# Patient Record
Sex: FEMALE | Race: WHITE | Employment: FULL TIME | URBAN - METROPOLITAN AREA
[De-identification: names, ages, dates, MRNs, and addresses within clinical notes are randomized per-mention and may not be internally consistent; named-entity substitution may affect disease eponyms.]

---

## 2017-07-20 ENCOUNTER — HOSPITAL ENCOUNTER (OUTPATIENT)
Dept: MAMMOGRAPHY | Age: 62
Discharge: HOME | End: 2017-07-20
Attending: INTERNAL MEDICINE | Admitting: INTERNAL MEDICINE

## 2017-07-20 DIAGNOSIS — Z12.31 VISIT FOR SCREENING MAMMOGRAM: ICD-10-CM

## 2018-07-24 ENCOUNTER — HOSPITAL ENCOUNTER (OUTPATIENT)
Dept: MAMMOGRAPHY | Age: 63
Discharge: HOME | End: 2018-07-25
Admitting: INTERNAL MEDICINE

## 2018-07-24 DIAGNOSIS — Z12.31 VISIT FOR SCREENING MAMMOGRAM: ICD-10-CM

## 2018-08-08 ENCOUNTER — HOSPITAL ENCOUNTER (OUTPATIENT)
Dept: MAMMOGRAPHY | Age: 63
Discharge: HOME | End: 2018-08-08
Admitting: INTERNAL MEDICINE

## 2018-08-08 DIAGNOSIS — Z12.31 VISIT FOR SCREENING MAMMOGRAM: ICD-10-CM

## 2019-01-01 ENCOUNTER — HOSPITAL ENCOUNTER (OUTPATIENT)
Dept: MAMMOGRAPHY | Age: 64
Discharge: HOME | End: 2019-08-17
Payer: COMMERCIAL

## 2019-01-01 DIAGNOSIS — Z12.31 VISIT FOR SCREENING MAMMOGRAM: ICD-10-CM

## 2019-01-01 PROCEDURE — 77063 BREAST TOMOSYNTHESIS BI: CPT

## 2020-01-01 ENCOUNTER — ANESTHESIA EVENT (OUTPATIENT)
Dept: ICU | Age: 65
End: 2020-01-01

## 2020-01-01 ENCOUNTER — APPOINTMENT (OUTPATIENT)
Dept: ULTRASOUND IMAGING | Age: 65
DRG: 870 | End: 2020-01-01
Attending: INTERNAL MEDICINE
Payer: COMMERCIAL

## 2020-01-01 ENCOUNTER — HOSPITAL ENCOUNTER (INPATIENT)
Age: 65
LOS: 13 days | DRG: 870 | End: 2020-03-03
Attending: INTERNAL MEDICINE | Admitting: HOSPITALIST
Payer: COMMERCIAL

## 2020-01-01 ENCOUNTER — APPOINTMENT (OUTPATIENT)
Dept: GENERAL RADIOLOGY | Age: 65
DRG: 870 | End: 2020-01-01
Attending: INTERNAL MEDICINE
Payer: COMMERCIAL

## 2020-01-01 ENCOUNTER — APPOINTMENT (OUTPATIENT)
Dept: CT IMAGING | Age: 65
DRG: 870 | End: 2020-01-01
Attending: INTERNAL MEDICINE
Payer: COMMERCIAL

## 2020-01-01 VITALS
OXYGEN SATURATION: 44 % | RESPIRATION RATE: 15 BRPM | HEIGHT: 64 IN | TEMPERATURE: 99.6 F | WEIGHT: 263.01 LBS | HEART RATE: 75 BPM | BODY MASS INDEX: 44.9 KG/M2 | SYSTOLIC BLOOD PRESSURE: 70 MMHG | DIASTOLIC BLOOD PRESSURE: 38 MMHG

## 2020-01-01 LAB
ALBUMIN SERPL-MCNC: 2.8 G/DL (ref 3.4–5)
ALP BLD-CCNC: 85 U/L (ref 40–129)
ALT SERPL-CCNC: 17 U/L (ref 10–40)
ANION GAP SERPL CALCULATED.3IONS-SCNC: 10 MMOL/L (ref 3–16)
ANION GAP SERPL CALCULATED.3IONS-SCNC: 10 MMOL/L (ref 3–16)
ANION GAP SERPL CALCULATED.3IONS-SCNC: 13 MMOL/L (ref 3–16)
ANION GAP SERPL CALCULATED.3IONS-SCNC: 6 MMOL/L (ref 3–16)
ANION GAP SERPL CALCULATED.3IONS-SCNC: 7 MMOL/L (ref 3–16)
ANION GAP SERPL CALCULATED.3IONS-SCNC: 8 MMOL/L (ref 3–16)
ANION GAP SERPL CALCULATED.3IONS-SCNC: 9 MMOL/L (ref 3–16)
APTT: 29.3 SEC (ref 24.2–36.2)
AST SERPL-CCNC: 33 U/L (ref 15–37)
BASE EXCESS ARTERIAL: -1.3 MMOL/L (ref -3–3)
BASE EXCESS ARTERIAL: -1.7 MMOL/L (ref -3–3)
BASE EXCESS ARTERIAL: -2 (ref -3–3)
BASE EXCESS ARTERIAL: -2.3 MMOL/L (ref -3–3)
BASE EXCESS ARTERIAL: -2.3 MMOL/L (ref -3–3)
BASE EXCESS ARTERIAL: 0 (ref -3–3)
BASE EXCESS ARTERIAL: 0.7 MMOL/L (ref -3–3)
BASE EXCESS ARTERIAL: 1 (ref -3–3)
BASE EXCESS ARTERIAL: 1 MMOL/L (ref -3–3)
BASE EXCESS ARTERIAL: 3.5 MMOL/L (ref -3–3)
BASE EXCESS ARTERIAL: 6 (ref -3–3)
BASE EXCESS ARTERIAL: 6.3 MMOL/L (ref -3–3)
BASE EXCESS ARTERIAL: 7 (ref -3–3)
BASE EXCESS ARTERIAL: 7.3 MMOL/L (ref -3–3)
BASE EXCESS ARTERIAL: 8 (ref -3–3)
BILIRUB SERPL-MCNC: 0.3 MG/DL (ref 0–1)
BILIRUBIN DIRECT: <0.2 MG/DL (ref 0–0.3)
BILIRUBIN, INDIRECT: ABNORMAL MG/DL (ref 0–1)
BLOOD CULTURE, ROUTINE: NORMAL
BUN BLDV-MCNC: 15 MG/DL (ref 7–20)
BUN BLDV-MCNC: 16 MG/DL (ref 7–20)
BUN BLDV-MCNC: 21 MG/DL (ref 7–20)
BUN BLDV-MCNC: 22 MG/DL (ref 7–20)
BUN BLDV-MCNC: 23 MG/DL (ref 7–20)
BUN BLDV-MCNC: 25 MG/DL (ref 7–20)
BUN BLDV-MCNC: 27 MG/DL (ref 7–20)
BUN BLDV-MCNC: 28 MG/DL (ref 7–20)
BUN BLDV-MCNC: 35 MG/DL (ref 7–20)
BUN BLDV-MCNC: 35 MG/DL (ref 7–20)
BUN BLDV-MCNC: 36 MG/DL (ref 7–20)
BUN BLDV-MCNC: 40 MG/DL (ref 7–20)
BUN BLDV-MCNC: 43 MG/DL (ref 7–20)
CALCIUM IONIZED: 1.18 MMOL/L (ref 1.12–1.32)
CALCIUM IONIZED: 1.29 MMOL/L (ref 1.12–1.32)
CALCIUM IONIZED: 1.37 MMOL/L (ref 1.12–1.32)
CALCIUM IONIZED: 1.38 MMOL/L (ref 1.12–1.32)
CALCIUM IONIZED: 1.41 MMOL/L (ref 1.12–1.32)
CALCIUM SERPL-MCNC: 8.3 MG/DL (ref 8.3–10.6)
CALCIUM SERPL-MCNC: 8.4 MG/DL (ref 8.3–10.6)
CALCIUM SERPL-MCNC: 8.5 MG/DL (ref 8.3–10.6)
CALCIUM SERPL-MCNC: 8.5 MG/DL (ref 8.3–10.6)
CALCIUM SERPL-MCNC: 8.6 MG/DL (ref 8.3–10.6)
CALCIUM SERPL-MCNC: 8.7 MG/DL (ref 8.3–10.6)
CALCIUM SERPL-MCNC: 8.7 MG/DL (ref 8.3–10.6)
CALCIUM SERPL-MCNC: 8.8 MG/DL (ref 8.3–10.6)
CALCIUM SERPL-MCNC: 9 MG/DL (ref 8.3–10.6)
CALCIUM SERPL-MCNC: 9.1 MG/DL (ref 8.3–10.6)
CALCIUM SERPL-MCNC: 9.1 MG/DL (ref 8.3–10.6)
CALCIUM SERPL-MCNC: 9.3 MG/DL (ref 8.3–10.6)
CALCIUM SERPL-MCNC: 9.6 MG/DL (ref 8.3–10.6)
CARBOXYHEMOGLOBIN ARTERIAL: 0.3 % (ref 0–1.5)
CARBOXYHEMOGLOBIN ARTERIAL: 0.3 % (ref 0–1.5)
CARBOXYHEMOGLOBIN ARTERIAL: 0.4 % (ref 0–1.5)
CARBOXYHEMOGLOBIN ARTERIAL: 0.5 % (ref 0–1.5)
CARBOXYHEMOGLOBIN ARTERIAL: 0.5 % (ref 0–1.5)
CARBOXYHEMOGLOBIN ARTERIAL: 0.6 % (ref 0–1.5)
CARBOXYHEMOGLOBIN ARTERIAL: 0.7 % (ref 0–1.5)
CARBOXYHEMOGLOBIN ARTERIAL: 0.7 % (ref 0–1.5)
CARBOXYHEMOGLOBIN ARTERIAL: 1.2 % (ref 0–1.5)
CHLORIDE BLD-SCNC: 101 MMOL/L (ref 99–110)
CHLORIDE BLD-SCNC: 102 MMOL/L (ref 99–110)
CHLORIDE BLD-SCNC: 103 MMOL/L (ref 99–110)
CHLORIDE BLD-SCNC: 106 MMOL/L (ref 99–110)
CHLORIDE BLD-SCNC: 106 MMOL/L (ref 99–110)
CHLORIDE BLD-SCNC: 109 MMOL/L (ref 99–110)
CHLORIDE BLD-SCNC: 110 MMOL/L (ref 99–110)
CHLORIDE BLD-SCNC: 110 MMOL/L (ref 99–110)
CHLORIDE BLD-SCNC: 111 MMOL/L (ref 99–110)
CHLORIDE BLD-SCNC: 112 MMOL/L (ref 99–110)
CHLORIDE BLD-SCNC: 113 MMOL/L (ref 99–110)
CHLORIDE BLD-SCNC: 113 MMOL/L (ref 99–110)
CHLORIDE BLD-SCNC: 96 MMOL/L (ref 99–110)
CO2: 22 MMOL/L (ref 21–32)
CO2: 26 MMOL/L (ref 21–32)
CO2: 27 MMOL/L (ref 21–32)
CO2: 30 MMOL/L (ref 21–32)
CO2: 30 MMOL/L (ref 21–32)
CO2: 32 MMOL/L (ref 21–32)
CO2: 33 MMOL/L (ref 21–32)
CO2: 33 MMOL/L (ref 21–32)
CREAT SERPL-MCNC: 0.5 MG/DL (ref 0.6–1.2)
CREAT SERPL-MCNC: 0.6 MG/DL (ref 0.6–1.2)
CREAT SERPL-MCNC: 0.7 MG/DL (ref 0.6–1.2)
CREAT SERPL-MCNC: <0.5 MG/DL (ref 0.6–1.2)
CULTURE, BLOOD 2: NORMAL
CULTURE, RESPIRATORY: ABNORMAL
FOLATE: 6.21 NG/ML (ref 4.78–24.2)
FOLATE: 6.95 NG/ML (ref 4.78–24.2)
GFR AFRICAN AMERICAN: >60
GFR NON-AFRICAN AMERICAN: >60
GLUCOSE BLD-MCNC: 100 MG/DL (ref 70–99)
GLUCOSE BLD-MCNC: 100 MG/DL (ref 70–99)
GLUCOSE BLD-MCNC: 101 MG/DL (ref 70–99)
GLUCOSE BLD-MCNC: 102 MG/DL (ref 70–99)
GLUCOSE BLD-MCNC: 103 MG/DL (ref 70–99)
GLUCOSE BLD-MCNC: 104 MG/DL (ref 70–99)
GLUCOSE BLD-MCNC: 106 MG/DL (ref 70–99)
GLUCOSE BLD-MCNC: 107 MG/DL (ref 70–99)
GLUCOSE BLD-MCNC: 107 MG/DL (ref 70–99)
GLUCOSE BLD-MCNC: 108 MG/DL (ref 70–99)
GLUCOSE BLD-MCNC: 109 MG/DL (ref 70–99)
GLUCOSE BLD-MCNC: 111 MG/DL (ref 70–99)
GLUCOSE BLD-MCNC: 112 MG/DL (ref 70–99)
GLUCOSE BLD-MCNC: 116 MG/DL (ref 70–99)
GLUCOSE BLD-MCNC: 116 MG/DL (ref 70–99)
GLUCOSE BLD-MCNC: 117 MG/DL (ref 70–99)
GLUCOSE BLD-MCNC: 119 MG/DL (ref 70–99)
GLUCOSE BLD-MCNC: 120 MG/DL (ref 70–99)
GLUCOSE BLD-MCNC: 120 MG/DL (ref 70–99)
GLUCOSE BLD-MCNC: 121 MG/DL (ref 70–99)
GLUCOSE BLD-MCNC: 121 MG/DL (ref 70–99)
GLUCOSE BLD-MCNC: 122 MG/DL (ref 70–99)
GLUCOSE BLD-MCNC: 123 MG/DL (ref 70–99)
GLUCOSE BLD-MCNC: 125 MG/DL (ref 70–99)
GLUCOSE BLD-MCNC: 126 MG/DL (ref 70–99)
GLUCOSE BLD-MCNC: 127 MG/DL (ref 70–99)
GLUCOSE BLD-MCNC: 131 MG/DL (ref 70–99)
GLUCOSE BLD-MCNC: 133 MG/DL (ref 70–99)
GLUCOSE BLD-MCNC: 134 MG/DL (ref 70–99)
GLUCOSE BLD-MCNC: 137 MG/DL (ref 70–99)
GLUCOSE BLD-MCNC: 143 MG/DL (ref 70–99)
GLUCOSE BLD-MCNC: 155 MG/DL (ref 70–99)
GLUCOSE BLD-MCNC: 77 MG/DL (ref 70–99)
GLUCOSE BLD-MCNC: 89 MG/DL (ref 70–99)
GLUCOSE BLD-MCNC: 89 MG/DL (ref 70–99)
GLUCOSE BLD-MCNC: 90 MG/DL (ref 70–99)
GLUCOSE BLD-MCNC: 91 MG/DL (ref 70–99)
GLUCOSE BLD-MCNC: 92 MG/DL (ref 70–99)
GLUCOSE BLD-MCNC: 92 MG/DL (ref 70–99)
GLUCOSE BLD-MCNC: 94 MG/DL (ref 70–99)
GLUCOSE BLD-MCNC: 95 MG/DL (ref 70–99)
GLUCOSE BLD-MCNC: 95 MG/DL (ref 70–99)
GLUCOSE BLD-MCNC: 99 MG/DL (ref 70–99)
GRAM STAIN RESULT: ABNORMAL
HCO3 ARTERIAL: 25.5 MMOL/L (ref 21–29)
HCO3 ARTERIAL: 25.7 MMOL/L (ref 21–29)
HCO3 ARTERIAL: 26 MMOL/L (ref 21–29)
HCO3 ARTERIAL: 26.1 MMOL/L (ref 21–29)
HCO3 ARTERIAL: 26.1 MMOL/L (ref 21–29)
HCO3 ARTERIAL: 26.3 MMOL/L (ref 21–29)
HCO3 ARTERIAL: 26.6 MMOL/L (ref 21–29)
HCO3 ARTERIAL: 27 MMOL/L (ref 21–29)
HCO3 ARTERIAL: 27.7 MMOL/L (ref 21–29)
HCO3 ARTERIAL: 27.9 MMOL/L (ref 21–29)
HCO3 ARTERIAL: 31.3 MMOL/L (ref 21–29)
HCO3 ARTERIAL: 31.9 MMOL/L (ref 21–29)
HCO3 ARTERIAL: 32.1 MMOL/L (ref 21–29)
HCO3 ARTERIAL: 32.7 MMOL/L (ref 21–29)
HCO3 ARTERIAL: 32.9 MMOL/L (ref 21–29)
HCT VFR BLD CALC: 21.8 % (ref 36–48)
HCT VFR BLD CALC: 21.9 % (ref 36–48)
HCT VFR BLD CALC: 22 % (ref 36–48)
HCT VFR BLD CALC: 22.6 % (ref 36–48)
HCT VFR BLD CALC: 22.7 % (ref 36–48)
HCT VFR BLD CALC: 23.9 % (ref 36–48)
HCT VFR BLD CALC: 24.8 % (ref 36–48)
HCT VFR BLD CALC: 25.6 % (ref 36–48)
HCT VFR BLD CALC: 28 % (ref 36–48)
HCT VFR BLD CALC: 28.4 % (ref 36–48)
HCT VFR BLD CALC: 31 % (ref 36–48)
HCT VFR BLD CALC: 32.1 % (ref 36–48)
HCT VFR BLD CALC: 33.8 % (ref 36–48)
HEMOGLOBIN, ART, EXTENDED: 10.1 G/DL (ref 12–16)
HEMOGLOBIN, ART, EXTENDED: 10.4 G/DL (ref 12–16)
HEMOGLOBIN, ART, EXTENDED: 10.8 G/DL (ref 12–16)
HEMOGLOBIN, ART, EXTENDED: 13 G/DL (ref 12–16)
HEMOGLOBIN, ART, EXTENDED: 7.4 G/DL (ref 12–16)
HEMOGLOBIN, ART, EXTENDED: 7.8 G/DL (ref 12–16)
HEMOGLOBIN, ART, EXTENDED: 7.9 G/DL (ref 12–16)
HEMOGLOBIN, ART, EXTENDED: 8.8 G/DL (ref 12–16)
HEMOGLOBIN, ART, EXTENDED: 9.8 G/DL (ref 12–16)
HEMOGLOBIN: 10 G/DL (ref 12–16)
HEMOGLOBIN: 10.3 GM/DL (ref 12–16)
HEMOGLOBIN: 10.5 G/DL (ref 12–16)
HEMOGLOBIN: 11 G/DL (ref 12–16)
HEMOGLOBIN: 11.3 GM/DL (ref 12–16)
HEMOGLOBIN: 7.2 G/DL (ref 12–16)
HEMOGLOBIN: 7.4 G/DL (ref 12–16)
HEMOGLOBIN: 7.4 G/DL (ref 12–16)
HEMOGLOBIN: 7.7 GM/DL (ref 12–16)
HEMOGLOBIN: 7.8 G/DL (ref 12–16)
HEMOGLOBIN: 8.1 G/DL (ref 12–16)
HEMOGLOBIN: 8.4 G/DL (ref 12–16)
HEMOGLOBIN: 8.6 GM/DL (ref 12–16)
HEMOGLOBIN: 8.9 G/DL (ref 12–16)
HEMOGLOBIN: 9 GM/DL (ref 12–16)
HEMOGLOBIN: 9.3 G/DL (ref 12–16)
IMMATURE RETIC FRACT: 0.51 (ref 0.21–0.37)
INR BLD: 1.07 (ref 0.86–1.14)
INR BLD: 1.17 (ref 0.86–1.14)
IRON SATURATION: 18 % (ref 15–50)
IRON SATURATION: 21 % (ref 15–50)
IRON: 23 UG/DL (ref 37–145)
IRON: 29 UG/DL (ref 37–145)
L. PNEUMOPHILA SEROGP 1 UR AG: NORMAL
LACTATE: 0.42 MMOL/L (ref 0.4–2)
LACTATE: 0.46 MMOL/L (ref 0.4–2)
LACTATE: 0.6 MMOL/L (ref 0.4–2)
LACTATE: 0.75 MMOL/L (ref 0.4–2)
LACTATE: 1.28 MMOL/L (ref 0.4–2)
LACTIC ACID: 1.2 MMOL/L (ref 0.4–2)
LV EF: 55 %
LVEF MODALITY: NORMAL
MAGNESIUM: 1.9 MG/DL (ref 1.8–2.4)
MAGNESIUM: 1.9 MG/DL (ref 1.8–2.4)
MAGNESIUM: 2 MG/DL (ref 1.8–2.4)
MAGNESIUM: 2.2 MG/DL (ref 1.8–2.4)
MAGNESIUM: 2.3 MG/DL (ref 1.8–2.4)
MCH RBC QN AUTO: 29.8 PG (ref 26–34)
MCH RBC QN AUTO: 30 PG (ref 26–34)
MCH RBC QN AUTO: 30.1 PG (ref 26–34)
MCH RBC QN AUTO: 30.2 PG (ref 26–34)
MCH RBC QN AUTO: 30.3 PG (ref 26–34)
MCH RBC QN AUTO: 30.3 PG (ref 26–34)
MCH RBC QN AUTO: 30.5 PG (ref 26–34)
MCH RBC QN AUTO: 30.5 PG (ref 26–34)
MCH RBC QN AUTO: 30.6 PG (ref 26–34)
MCH RBC QN AUTO: 30.6 PG (ref 26–34)
MCH RBC QN AUTO: 31 PG (ref 26–34)
MCHC RBC AUTO-ENTMCNC: 32 G/DL (ref 31–36)
MCHC RBC AUTO-ENTMCNC: 32.3 G/DL (ref 31–36)
MCHC RBC AUTO-ENTMCNC: 32.5 G/DL (ref 31–36)
MCHC RBC AUTO-ENTMCNC: 32.6 G/DL (ref 31–36)
MCHC RBC AUTO-ENTMCNC: 32.7 G/DL (ref 31–36)
MCHC RBC AUTO-ENTMCNC: 32.8 G/DL (ref 31–36)
MCHC RBC AUTO-ENTMCNC: 33.1 G/DL (ref 31–36)
MCV RBC AUTO: 91.9 FL (ref 80–100)
MCV RBC AUTO: 92.1 FL (ref 80–100)
MCV RBC AUTO: 92.2 FL (ref 80–100)
MCV RBC AUTO: 92.4 FL (ref 80–100)
MCV RBC AUTO: 92.7 FL (ref 80–100)
MCV RBC AUTO: 92.9 FL (ref 80–100)
MCV RBC AUTO: 93 FL (ref 80–100)
MCV RBC AUTO: 93.1 FL (ref 80–100)
MCV RBC AUTO: 93.2 FL (ref 80–100)
MCV RBC AUTO: 93.2 FL (ref 80–100)
MCV RBC AUTO: 93.9 FL (ref 80–100)
MCV RBC AUTO: 94.4 FL (ref 80–100)
MCV RBC AUTO: 95 FL (ref 80–100)
METHEMOGLOBIN ARTERIAL: 0.3 %
METHEMOGLOBIN ARTERIAL: 0.4 %
METHEMOGLOBIN ARTERIAL: 0.5 %
METHEMOGLOBIN ARTERIAL: 0.5 %
METHEMOGLOBIN ARTERIAL: 0.6 %
METHEMOGLOBIN ARTERIAL: 0.7 %
METHEMOGLOBIN ARTERIAL: 0.9 %
MRSA SCREEN RT-PCR: NORMAL
O2 CONTENT ARTERIAL: 10 ML/DL
O2 CONTENT ARTERIAL: 11 ML/DL
O2 CONTENT ARTERIAL: 11 ML/DL
O2 CONTENT ARTERIAL: 12 ML/DL
O2 CONTENT ARTERIAL: 14 ML/DL
O2 CONTENT ARTERIAL: 15 ML/DL
O2 CONTENT ARTERIAL: 18 ML/DL
O2 SAT, ARTERIAL: 78 % (ref 93–100)
O2 SAT, ARTERIAL: 81 % (ref 93–100)
O2 SAT, ARTERIAL: 95 % (ref 93–100)
O2 SAT, ARTERIAL: 96 % (ref 93–100)
O2 SAT, ARTERIAL: 96.4 %
O2 SAT, ARTERIAL: 97.2 %
O2 SAT, ARTERIAL: 97.4 %
O2 SAT, ARTERIAL: 97.8 %
O2 SAT, ARTERIAL: 98 %
O2 SAT, ARTERIAL: 98.1 %
O2 SAT, ARTERIAL: 98.4 %
O2 SAT, ARTERIAL: 98.6 %
O2 SAT, ARTERIAL: 99.3 %
O2 THERAPY: ABNORMAL
O2 THERAPY: NORMAL
ORGANISM: ABNORMAL
PCO2 ARTERIAL: 40.9 MMHG (ref 35–45)
PCO2 ARTERIAL: 43.7 MMHG (ref 35–45)
PCO2 ARTERIAL: 44.5 MMHG (ref 35–45)
PCO2 ARTERIAL: 47.1 MMHG (ref 35–45)
PCO2 ARTERIAL: 47.7 MM HG (ref 35–45)
PCO2 ARTERIAL: 50.6 MMHG (ref 35–45)
PCO2 ARTERIAL: 54.9 MMHG (ref 35–45)
PCO2 ARTERIAL: 55.3 MM HG (ref 35–45)
PCO2 ARTERIAL: 59.5 MMHG (ref 35–45)
PCO2 ARTERIAL: 61.8 MM HG (ref 35–45)
PCO2 ARTERIAL: 62.2 MM HG (ref 35–45)
PCO2 ARTERIAL: 62.2 MMHG (ref 35–45)
PCO2 ARTERIAL: 64 MM HG (ref 35–45)
PCO2 ARTERIAL: 64 MMHG (ref 35–45)
PCO2 ARTERIAL: 66 MM HG (ref 35–45)
PDW BLD-RTO: 14.5 % (ref 12.4–15.4)
PDW BLD-RTO: 14.7 % (ref 12.4–15.4)
PDW BLD-RTO: 14.7 % (ref 12.4–15.4)
PDW BLD-RTO: 14.8 % (ref 12.4–15.4)
PDW BLD-RTO: 15 % (ref 12.4–15.4)
PDW BLD-RTO: 15 % (ref 12.4–15.4)
PDW BLD-RTO: 15.1 % (ref 12.4–15.4)
PDW BLD-RTO: 15.3 % (ref 12.4–15.4)
PDW BLD-RTO: 15.4 % (ref 12.4–15.4)
PDW BLD-RTO: 15.5 % (ref 12.4–15.4)
PDW BLD-RTO: 15.6 % (ref 12.4–15.4)
PERFORMED ON: ABNORMAL
PERFORMED ON: NORMAL
PH ARTERIAL: 7.21 (ref 7.35–7.45)
PH ARTERIAL: 7.23 (ref 7.35–7.45)
PH ARTERIAL: 7.23 (ref 7.35–7.45)
PH ARTERIAL: 7.24 (ref 7.35–7.45)
PH ARTERIAL: 7.26 (ref 7.35–7.45)
PH ARTERIAL: 7.28 (ref 7.35–7.45)
PH ARTERIAL: 7.29 (ref 7.35–7.45)
PH ARTERIAL: 7.31 (ref 7.35–7.45)
PH ARTERIAL: 7.33 (ref 7.35–7.45)
PH ARTERIAL: 7.38 (ref 7.35–7.45)
PH ARTERIAL: 7.39 (ref 7.35–7.45)
PH ARTERIAL: 7.42 (ref 7.35–7.45)
PH ARTERIAL: 7.43 (ref 7.35–7.45)
PH ARTERIAL: 7.43 (ref 7.35–7.45)
PH ARTERIAL: 7.44 (ref 7.35–7.45)
PHOSPHORUS: 0.7 MG/DL (ref 2.5–4.9)
PHOSPHORUS: 2 MG/DL (ref 2.5–4.9)
PHOSPHORUS: 2.1 MG/DL (ref 2.5–4.9)
PHOSPHORUS: 2.6 MG/DL (ref 2.5–4.9)
PHOSPHORUS: 2.7 MG/DL (ref 2.5–4.9)
PLATELET # BLD: 142 K/UL (ref 135–450)
PLATELET # BLD: 144 K/UL (ref 135–450)
PLATELET # BLD: 147 K/UL (ref 135–450)
PLATELET # BLD: 152 K/UL (ref 135–450)
PLATELET # BLD: 171 K/UL (ref 135–450)
PLATELET # BLD: 195 K/UL (ref 135–450)
PLATELET # BLD: 211 K/UL (ref 135–450)
PLATELET # BLD: 219 K/UL (ref 135–450)
PLATELET # BLD: 253 K/UL (ref 135–450)
PLATELET # BLD: 260 K/UL (ref 135–450)
PLATELET # BLD: 267 K/UL (ref 135–450)
PLATELET # BLD: 294 K/UL (ref 135–450)
PLATELET # BLD: 314 K/UL (ref 135–450)
PMV BLD AUTO: 8.6 FL (ref 5–10.5)
PMV BLD AUTO: 8.8 FL (ref 5–10.5)
PMV BLD AUTO: 9.1 FL (ref 5–10.5)
PMV BLD AUTO: 9.1 FL (ref 5–10.5)
PMV BLD AUTO: 9.2 FL (ref 5–10.5)
PMV BLD AUTO: 9.3 FL (ref 5–10.5)
PMV BLD AUTO: 9.4 FL (ref 5–10.5)
PMV BLD AUTO: 9.5 FL (ref 5–10.5)
PMV BLD AUTO: 9.6 FL (ref 5–10.5)
PO2 ARTERIAL: 105 MMHG (ref 75–108)
PO2 ARTERIAL: 109 MMHG (ref 75–108)
PO2 ARTERIAL: 109 MMHG (ref 75–108)
PO2 ARTERIAL: 112 MMHG (ref 75–108)
PO2 ARTERIAL: 128 MMHG (ref 75–108)
PO2 ARTERIAL: 52.7 MM HG (ref 75–108)
PO2 ARTERIAL: 53.1 MM HG (ref 75–108)
PO2 ARTERIAL: 77.3 MM HG (ref 75–108)
PO2 ARTERIAL: 82.5 MM HG (ref 75–108)
PO2 ARTERIAL: 84.3 MM HG (ref 75–108)
PO2 ARTERIAL: 85.1 MM HG (ref 75–108)
PO2 ARTERIAL: 86.9 MMHG (ref 75–108)
PO2 ARTERIAL: 87.1 MMHG (ref 75–108)
PO2 ARTERIAL: 89 MMHG (ref 75–108)
PO2 ARTERIAL: 97 MMHG (ref 75–108)
POC HEMATOCRIT: 23 % (ref 36–48)
POC HEMATOCRIT: 25 % (ref 36–48)
POC HEMATOCRIT: 26 % (ref 36–48)
POC HEMATOCRIT: 30 % (ref 36–48)
POC HEMATOCRIT: 33 % (ref 36–48)
POC POTASSIUM: 3.2 MMOL/L (ref 3.5–5.1)
POC POTASSIUM: 3.3 MMOL/L (ref 3.5–5.1)
POC POTASSIUM: 3.4 MMOL/L (ref 3.5–5.1)
POC POTASSIUM: 3.6 MMOL/L (ref 3.5–5.1)
POC POTASSIUM: 3.6 MMOL/L (ref 3.5–5.1)
POC SAMPLE TYPE: ABNORMAL
POC SODIUM: 141 MMOL/L (ref 136–145)
POC SODIUM: 144 MMOL/L (ref 136–145)
POC SODIUM: 151 MMOL/L (ref 136–145)
POC SODIUM: 156 MMOL/L (ref 136–145)
POC SODIUM: 156 MMOL/L (ref 136–145)
POTASSIUM SERPL-SCNC: 3.1 MMOL/L (ref 3.5–5.1)
POTASSIUM SERPL-SCNC: 3.1 MMOL/L (ref 3.5–5.1)
POTASSIUM SERPL-SCNC: 3.2 MMOL/L (ref 3.5–5.1)
POTASSIUM SERPL-SCNC: 3.2 MMOL/L (ref 3.5–5.1)
POTASSIUM SERPL-SCNC: 3.5 MMOL/L (ref 3.5–5.1)
POTASSIUM SERPL-SCNC: 3.6 MMOL/L (ref 3.5–5.1)
POTASSIUM SERPL-SCNC: 3.7 MMOL/L (ref 3.5–5.1)
POTASSIUM SERPL-SCNC: 3.7 MMOL/L (ref 3.5–5.1)
POTASSIUM SERPL-SCNC: 3.8 MMOL/L (ref 3.5–5.1)
POTASSIUM SERPL-SCNC: 3.8 MMOL/L (ref 3.5–5.1)
POTASSIUM SERPL-SCNC: 3.9 MMOL/L (ref 3.5–5.1)
POTASSIUM SERPL-SCNC: 4 MMOL/L (ref 3.5–5.1)
PROCALCITONIN: 1.55 NG/ML (ref 0–0.15)
PROCALCITONIN: 39.22 NG/ML (ref 0–0.15)
PROTHROMBIN TIME: 12.4 SEC (ref 10–13.2)
PROTHROMBIN TIME: 13.6 SEC (ref 10–13.2)
RBC # BLD: 2.35 M/UL (ref 4–5.2)
RBC # BLD: 2.36 M/UL (ref 4–5.2)
RBC # BLD: 2.37 M/UL (ref 4–5.2)
RBC # BLD: 2.44 M/UL (ref 4–5.2)
RBC # BLD: 2.46 M/UL (ref 4–5.2)
RBC # BLD: 2.57 M/UL (ref 4–5.2)
RBC # BLD: 2.69 M/UL (ref 4–5.2)
RBC # BLD: 2.78 M/UL (ref 4–5.2)
RBC # BLD: 3 M/UL (ref 4–5.2)
RBC # BLD: 3.06 M/UL (ref 4–5.2)
RBC # BLD: 3.29 M/UL (ref 4–5.2)
RBC # BLD: 3.42 M/UL (ref 4–5.2)
RBC # BLD: 3.56 M/UL (ref 4–5.2)
RETICULOCYTE ABSOLUTE COUNT: 0.05 M/UL (ref 0.02–0.1)
RETICULOCYTE COUNT PCT: 2.14 % (ref 0.5–2.18)
SODIUM BLD-SCNC: 136 MMOL/L (ref 136–145)
SODIUM BLD-SCNC: 140 MMOL/L (ref 136–145)
SODIUM BLD-SCNC: 141 MMOL/L (ref 136–145)
SODIUM BLD-SCNC: 141 MMOL/L (ref 136–145)
SODIUM BLD-SCNC: 143 MMOL/L (ref 136–145)
SODIUM BLD-SCNC: 144 MMOL/L (ref 136–145)
SODIUM BLD-SCNC: 145 MMOL/L (ref 136–145)
SODIUM BLD-SCNC: 145 MMOL/L (ref 136–145)
SODIUM BLD-SCNC: 146 MMOL/L (ref 136–145)
SODIUM BLD-SCNC: 149 MMOL/L (ref 136–145)
SODIUM BLD-SCNC: 149 MMOL/L (ref 136–145)
SODIUM BLD-SCNC: 150 MMOL/L (ref 136–145)
SODIUM BLD-SCNC: 151 MMOL/L (ref 136–145)
STREP PNEUMONIAE ANTIGEN, URINE: NORMAL
TCO2 ARTERIAL: 28 MMOL/L
TCO2 ARTERIAL: 28 MMOL/L
TCO2 ARTERIAL: 30 MMOL/L
TCO2 ARTERIAL: 33 MMOL/L
TCO2 ARTERIAL: 34 MMOL/L
TCO2 ARTERIAL: 35 MMOL/L
TCO2 ARTERIAL: 61 MMOL/L
TCO2 ARTERIAL: 61.5 MMOL/L
TCO2 ARTERIAL: 61.7 MMOL/L
TCO2 ARTERIAL: 62 MMOL/L
TCO2 ARTERIAL: 62.5 MMOL/L
TCO2 ARTERIAL: 64.9 MMOL/L
TCO2 ARTERIAL: 65.4 MMOL/L
TCO2 ARTERIAL: 73.3 MMOL/L
TCO2 ARTERIAL: 76.7 MMOL/L
TOTAL IRON BINDING CAPACITY: 125 UG/DL (ref 260–445)
TOTAL IRON BINDING CAPACITY: 138 UG/DL (ref 260–445)
TOTAL PROTEIN: 6.7 G/DL (ref 6.4–8.2)
TRIGL SERPL-MCNC: 127 MG/DL (ref 0–150)
VANCOMYCIN TROUGH: 18.7 UG/ML (ref 10–20)
VITAMIN B-12: >2000 PG/ML (ref 211–911)
VITAMIN B-12: >2000 PG/ML (ref 211–911)
WBC # BLD: 11 K/UL (ref 4–11)
WBC # BLD: 11.8 K/UL (ref 4–11)
WBC # BLD: 11.9 K/UL (ref 4–11)
WBC # BLD: 12 K/UL (ref 4–11)
WBC # BLD: 12.1 K/UL (ref 4–11)
WBC # BLD: 12.6 K/UL (ref 4–11)
WBC # BLD: 13 K/UL (ref 4–11)
WBC # BLD: 13.5 K/UL (ref 4–11)
WBC # BLD: 15.5 K/UL (ref 4–11)
WBC # BLD: 17.6 K/UL (ref 4–11)
WBC # BLD: 18.1 K/UL (ref 4–11)
WBC # BLD: 6.2 K/UL (ref 4–11)
WBC # BLD: 6.9 K/UL (ref 4–11)

## 2020-01-01 PROCEDURE — 82330 ASSAY OF CALCIUM: CPT

## 2020-01-01 PROCEDURE — 94750 HC PULMONARY COMPLIANCE STUDY: CPT

## 2020-01-01 PROCEDURE — 6360000002 HC RX W HCPCS: Performed by: INTERNAL MEDICINE

## 2020-01-01 PROCEDURE — 94640 AIRWAY INHALATION TREATMENT: CPT

## 2020-01-01 PROCEDURE — 87205 SMEAR GRAM STAIN: CPT

## 2020-01-01 PROCEDURE — 6370000000 HC RX 637 (ALT 250 FOR IP): Performed by: INTERNAL MEDICINE

## 2020-01-01 PROCEDURE — 94770 HC ETCO2 MONITOR DAILY: CPT

## 2020-01-01 PROCEDURE — 80048 BASIC METABOLIC PNL TOTAL CA: CPT

## 2020-01-01 PROCEDURE — 87040 BLOOD CULTURE FOR BACTERIA: CPT

## 2020-01-01 PROCEDURE — 3609027000 HC BRONCHOSCOPY: Performed by: INTERNAL MEDICINE

## 2020-01-01 PROCEDURE — 2000000000 HC ICU R&B

## 2020-01-01 PROCEDURE — 0B9B8ZZ DRAINAGE OF LEFT LOWER LOBE BRONCHUS, VIA NATURAL OR ARTIFICIAL OPENING ENDOSCOPIC: ICD-10-PCS | Performed by: INTERNAL MEDICINE

## 2020-01-01 PROCEDURE — 87102 FUNGUS ISOLATION CULTURE: CPT

## 2020-01-01 PROCEDURE — C9113 INJ PANTOPRAZOLE SODIUM, VIA: HCPCS | Performed by: INTERNAL MEDICINE

## 2020-01-01 PROCEDURE — 82746 ASSAY OF FOLIC ACID SERUM: CPT

## 2020-01-01 PROCEDURE — 2580000003 HC RX 258: Performed by: INTERNAL MEDICINE

## 2020-01-01 PROCEDURE — 85014 HEMATOCRIT: CPT

## 2020-01-01 PROCEDURE — 87641 MR-STAPH DNA AMP PROBE: CPT

## 2020-01-01 PROCEDURE — 85027 COMPLETE CBC AUTOMATED: CPT

## 2020-01-01 PROCEDURE — 88305 TISSUE EXAM BY PATHOLOGIST: CPT

## 2020-01-01 PROCEDURE — 93306 TTE W/DOPPLER COMPLETE: CPT

## 2020-01-01 PROCEDURE — 94003 VENT MGMT INPAT SUBQ DAY: CPT

## 2020-01-01 PROCEDURE — 2580000003 HC RX 258: Performed by: HOSPITALIST

## 2020-01-01 PROCEDURE — 84100 ASSAY OF PHOSPHORUS: CPT

## 2020-01-01 PROCEDURE — 2700000000 HC OXYGEN THERAPY PER DAY

## 2020-01-01 PROCEDURE — 87449 NOS EACH ORGANISM AG IA: CPT

## 2020-01-01 PROCEDURE — 83735 ASSAY OF MAGNESIUM: CPT

## 2020-01-01 PROCEDURE — 71045 X-RAY EXAM CHEST 1 VIEW: CPT

## 2020-01-01 PROCEDURE — 5A1955Z RESPIRATORY VENTILATION, GREATER THAN 96 CONSECUTIVE HOURS: ICD-10-PCS | Performed by: INTERNAL MEDICINE

## 2020-01-01 PROCEDURE — 71250 CT THORAX DX C-: CPT

## 2020-01-01 PROCEDURE — 87015 SPECIMEN INFECT AGNT CONCNTJ: CPT

## 2020-01-01 PROCEDURE — 99291 CRITICAL CARE FIRST HOUR: CPT | Performed by: INTERNAL MEDICINE

## 2020-01-01 PROCEDURE — 80202 ASSAY OF VANCOMYCIN: CPT

## 2020-01-01 PROCEDURE — 87070 CULTURE OTHR SPECIMN AEROBIC: CPT

## 2020-01-01 PROCEDURE — 36415 COLL VENOUS BLD VENIPUNCTURE: CPT

## 2020-01-01 PROCEDURE — 94761 N-INVAS EAR/PLS OXIMETRY MLT: CPT

## 2020-01-01 PROCEDURE — 82803 BLOOD GASES ANY COMBINATION: CPT

## 2020-01-01 PROCEDURE — 0B938ZZ DRAINAGE OF RIGHT MAIN BRONCHUS, VIA NATURAL OR ARTIFICIAL OPENING ENDOSCOPIC: ICD-10-PCS | Performed by: INTERNAL MEDICINE

## 2020-01-01 PROCEDURE — 6360000002 HC RX W HCPCS

## 2020-01-01 PROCEDURE — 36592 COLLECT BLOOD FROM PICC: CPT

## 2020-01-01 PROCEDURE — 87116 MYCOBACTERIA CULTURE: CPT

## 2020-01-01 PROCEDURE — 85610 PROTHROMBIN TIME: CPT

## 2020-01-01 PROCEDURE — 2709999900 US CHEST INCLUDING MEDIASTINUM

## 2020-01-01 PROCEDURE — 0BJ08ZZ INSPECTION OF TRACHEOBRONCHIAL TREE, VIA NATURAL OR ARTIFICIAL OPENING ENDOSCOPIC: ICD-10-PCS | Performed by: INTERNAL MEDICINE

## 2020-01-01 PROCEDURE — 31500 INSERT EMERGENCY AIRWAY: CPT | Performed by: INTERNAL MEDICINE

## 2020-01-01 PROCEDURE — 70450 CT HEAD/BRAIN W/O DYE: CPT

## 2020-01-01 PROCEDURE — 36556 INSERT NON-TUNNEL CV CATH: CPT

## 2020-01-01 PROCEDURE — 87186 SC STD MICRODIL/AGAR DIL: CPT

## 2020-01-01 PROCEDURE — 83605 ASSAY OF LACTIC ACID: CPT

## 2020-01-01 PROCEDURE — 87077 CULTURE AEROBIC IDENTIFY: CPT

## 2020-01-01 PROCEDURE — 86403 PARTICLE AGGLUT ANTBDY SCRN: CPT

## 2020-01-01 PROCEDURE — 94660 CPAP INITIATION&MGMT: CPT

## 2020-01-01 PROCEDURE — 2500000003 HC RX 250 WO HCPCS: Performed by: INTERNAL MEDICINE

## 2020-01-01 PROCEDURE — 84132 ASSAY OF SERUM POTASSIUM: CPT

## 2020-01-01 PROCEDURE — 80076 HEPATIC FUNCTION PANEL: CPT

## 2020-01-01 PROCEDURE — 6370000000 HC RX 637 (ALT 250 FOR IP): Performed by: HOSPITALIST

## 2020-01-01 PROCEDURE — 84145 PROCALCITONIN (PCT): CPT

## 2020-01-01 PROCEDURE — 31622 DX BRONCHOSCOPE/WASH: CPT | Performed by: INTERNAL MEDICINE

## 2020-01-01 PROCEDURE — 84295 ASSAY OF SERUM SODIUM: CPT

## 2020-01-01 PROCEDURE — 85045 AUTOMATED RETICULOCYTE COUNT: CPT

## 2020-01-01 PROCEDURE — 2500000003 HC RX 250 WO HCPCS

## 2020-01-01 PROCEDURE — 87206 SMEAR FLUORESCENT/ACID STAI: CPT

## 2020-01-01 PROCEDURE — 80285 DRUG ASSAY VORICONAZOLE: CPT

## 2020-01-01 PROCEDURE — 31622 DX BRONCHOSCOPE/WASH: CPT

## 2020-01-01 PROCEDURE — 0BH17EZ INSERTION OF ENDOTRACHEAL AIRWAY INTO TRACHEA, VIA NATURAL OR ARTIFICIAL OPENING: ICD-10-PCS | Performed by: INTERNAL MEDICINE

## 2020-01-01 PROCEDURE — 82947 ASSAY GLUCOSE BLOOD QUANT: CPT

## 2020-01-01 PROCEDURE — 94002 VENT MGMT INPAT INIT DAY: CPT

## 2020-01-01 PROCEDURE — 02HV33Z INSERTION OF INFUSION DEVICE INTO SUPERIOR VENA CAVA, PERCUTANEOUS APPROACH: ICD-10-PCS | Performed by: INTERNAL MEDICINE

## 2020-01-01 PROCEDURE — 83540 ASSAY OF IRON: CPT

## 2020-01-01 PROCEDURE — 82607 VITAMIN B-12: CPT

## 2020-01-01 PROCEDURE — 88112 CYTOPATH CELL ENHANCE TECH: CPT

## 2020-01-01 PROCEDURE — 88312 SPECIAL STAINS GROUP 1: CPT

## 2020-01-01 PROCEDURE — 83550 IRON BINDING TEST: CPT

## 2020-01-01 PROCEDURE — 31645 BRNCHSC W/THER ASPIR 1ST: CPT | Performed by: INTERNAL MEDICINE

## 2020-01-01 PROCEDURE — 99223 1ST HOSP IP/OBS HIGH 75: CPT | Performed by: INTERNAL MEDICINE

## 2020-01-01 PROCEDURE — 36556 INSERT NON-TUNNEL CV CATH: CPT | Performed by: INTERNAL MEDICINE

## 2020-01-01 PROCEDURE — 0B978ZZ DRAINAGE OF LEFT MAIN BRONCHUS, VIA NATURAL OR ARTIFICIAL OPENING ENDOSCOPIC: ICD-10-PCS | Performed by: INTERNAL MEDICINE

## 2020-01-01 PROCEDURE — 2709999900 HC NON-CHARGEABLE SUPPLY: Performed by: INTERNAL MEDICINE

## 2020-01-01 PROCEDURE — 74018 RADEX ABDOMEN 1 VIEW: CPT

## 2020-01-01 PROCEDURE — 84478 ASSAY OF TRIGLYCERIDES: CPT

## 2020-01-01 PROCEDURE — 76937 US GUIDE VASCULAR ACCESS: CPT | Performed by: INTERNAL MEDICINE

## 2020-01-01 PROCEDURE — 31500 INSERT EMERGENCY AIRWAY: CPT

## 2020-01-01 PROCEDURE — 36600 WITHDRAWAL OF ARTERIAL BLOOD: CPT

## 2020-01-01 PROCEDURE — 85730 THROMBOPLASTIN TIME PARTIAL: CPT

## 2020-01-01 RX ORDER — LORAZEPAM 2 MG/ML
1 INJECTION INTRAMUSCULAR
Status: DISCONTINUED | OUTPATIENT
Start: 2020-01-01 | End: 2020-01-01 | Stop reason: HOSPADM

## 2020-01-01 RX ORDER — ACETAMINOPHEN 325 MG/1
650 TABLET ORAL EVERY 6 HOURS PRN
Status: DISCONTINUED | OUTPATIENT
Start: 2020-01-01 | End: 2020-01-01 | Stop reason: HOSPADM

## 2020-01-01 RX ORDER — HYDROCHLOROTHIAZIDE 25 MG/1
25 TABLET ORAL DAILY
COMMUNITY

## 2020-01-01 RX ORDER — POLYETHYLENE GLYCOL 3350 17 G/17G
17 POWDER, FOR SOLUTION ORAL DAILY PRN
Status: DISCONTINUED | OUTPATIENT
Start: 2020-01-01 | End: 2020-01-01 | Stop reason: HOSPADM

## 2020-01-01 RX ORDER — ALBUTEROL SULFATE 90 UG/1
6 AEROSOL, METERED RESPIRATORY (INHALATION) EVERY 4 HOURS
Status: DISCONTINUED | OUTPATIENT
Start: 2020-01-01 | End: 2020-01-01

## 2020-01-01 RX ORDER — CHLORHEXIDINE GLUCONATE 0.12 MG/ML
15 RINSE ORAL 2 TIMES DAILY
Status: DISCONTINUED | OUTPATIENT
Start: 2020-01-01 | End: 2020-01-01 | Stop reason: HOSPADM

## 2020-01-01 RX ORDER — PROPOFOL 10 MG/ML
INJECTION, EMULSION INTRAVENOUS
Status: COMPLETED
Start: 2020-01-01 | End: 2020-01-01

## 2020-01-01 RX ORDER — SODIUM CHLORIDE 9 MG/ML
INJECTION, SOLUTION INTRAVENOUS CONTINUOUS
Status: DISCONTINUED | OUTPATIENT
Start: 2020-01-01 | End: 2020-01-01

## 2020-01-01 RX ORDER — SODIUM CHLORIDE 0.9 % (FLUSH) 0.9 %
10 SYRINGE (ML) INJECTION EVERY 12 HOURS SCHEDULED
Status: DISCONTINUED | OUTPATIENT
Start: 2020-01-01 | End: 2020-01-01 | Stop reason: HOSPADM

## 2020-01-01 RX ORDER — CIPROFLOXACIN 2 MG/ML
400 INJECTION, SOLUTION INTRAVENOUS
Status: ACTIVE | OUTPATIENT
Start: 2020-01-01 | End: 2020-01-01

## 2020-01-01 RX ORDER — INSULIN LISPRO 100 [IU]/ML
0-6 INJECTION, SOLUTION INTRAVENOUS; SUBCUTANEOUS EVERY 4 HOURS
Status: DISCONTINUED | OUTPATIENT
Start: 2020-01-01 | End: 2020-01-01 | Stop reason: HOSPADM

## 2020-01-01 RX ORDER — ONDANSETRON 2 MG/ML
4 INJECTION INTRAMUSCULAR; INTRAVENOUS EVERY 6 HOURS PRN
Status: DISCONTINUED | OUTPATIENT
Start: 2020-01-01 | End: 2020-01-01 | Stop reason: HOSPADM

## 2020-01-01 RX ORDER — PROPOFOL 10 MG/ML
10 INJECTION, EMULSION INTRAVENOUS
Status: DISCONTINUED | OUTPATIENT
Start: 2020-01-01 | End: 2020-01-01 | Stop reason: HOSPADM

## 2020-01-01 RX ORDER — SODIUM CHLORIDE 0.9 % (FLUSH) 0.9 %
10 SYRINGE (ML) INJECTION PRN
Status: DISCONTINUED | OUTPATIENT
Start: 2020-01-01 | End: 2020-01-01 | Stop reason: HOSPADM

## 2020-01-01 RX ORDER — POTASSIUM CHLORIDE 29.8 MG/ML
20 INJECTION INTRAVENOUS PRN
Status: DISCONTINUED | OUTPATIENT
Start: 2020-01-01 | End: 2020-01-01 | Stop reason: HOSPADM

## 2020-01-01 RX ORDER — OSELTAMIVIR PHOSPHATE 75 MG/1
75 CAPSULE ORAL 2 TIMES DAILY
Status: DISCONTINUED | OUTPATIENT
Start: 2020-01-01 | End: 2020-01-01

## 2020-01-01 RX ORDER — PROMETHAZINE HYDROCHLORIDE 25 MG/1
12.5 TABLET ORAL EVERY 6 HOURS PRN
Status: DISCONTINUED | OUTPATIENT
Start: 2020-01-01 | End: 2020-01-01 | Stop reason: HOSPADM

## 2020-01-01 RX ORDER — FENTANYL CITRATE 50 UG/ML
50 INJECTION, SOLUTION INTRAMUSCULAR; INTRAVENOUS
Status: DISCONTINUED | OUTPATIENT
Start: 2020-01-01 | End: 2020-01-01

## 2020-01-01 RX ORDER — SODIUM CHLORIDE FOR INHALATION 3 %
15 VIAL, NEBULIZER (ML) INHALATION 3 TIMES DAILY
Status: DISCONTINUED | OUTPATIENT
Start: 2020-01-01 | End: 2020-01-01

## 2020-01-01 RX ORDER — LEVOFLOXACIN 5 MG/ML
500 INJECTION, SOLUTION INTRAVENOUS EVERY 24 HOURS
Status: DISCONTINUED | OUTPATIENT
Start: 2020-01-01 | End: 2020-01-01

## 2020-01-01 RX ORDER — FENTANYL CITRATE 50 UG/ML
100 INJECTION, SOLUTION INTRAMUSCULAR; INTRAVENOUS
Status: DISCONTINUED | OUTPATIENT
Start: 2020-01-01 | End: 2020-01-01 | Stop reason: HOSPADM

## 2020-01-01 RX ORDER — LIDOCAINE HYDROCHLORIDE 40 MG/ML
SOLUTION TOPICAL PRN
Status: DISCONTINUED | OUTPATIENT
Start: 2020-01-01 | End: 2020-01-01 | Stop reason: ALTCHOICE

## 2020-01-01 RX ORDER — SENNA AND DOCUSATE SODIUM 50; 8.6 MG/1; MG/1
2 TABLET, FILM COATED ORAL DAILY
Status: DISCONTINUED | OUTPATIENT
Start: 2020-01-01 | End: 2020-01-01 | Stop reason: HOSPADM

## 2020-01-01 RX ORDER — LACTULOSE 10 G/15ML
20 SOLUTION ORAL 2 TIMES DAILY
Status: DISCONTINUED | OUTPATIENT
Start: 2020-01-01 | End: 2020-01-01 | Stop reason: HOSPADM

## 2020-01-01 RX ORDER — ACETAMINOPHEN 650 MG/1
650 SUPPOSITORY RECTAL EVERY 6 HOURS PRN
Status: DISCONTINUED | OUTPATIENT
Start: 2020-01-01 | End: 2020-01-01 | Stop reason: SDUPTHER

## 2020-01-01 RX ORDER — LIDOCAINE HYDROCHLORIDE 10 MG/ML
5 INJECTION, SOLUTION EPIDURAL; INFILTRATION; INTRACAUDAL; PERINEURAL ONCE
Status: DISCONTINUED | OUTPATIENT
Start: 2020-01-01 | End: 2020-01-01 | Stop reason: HOSPADM

## 2020-01-01 RX ORDER — SODIUM CHLORIDE FOR INHALATION 3 %
3 VIAL, NEBULIZER (ML) INHALATION 3 TIMES DAILY
Status: DISCONTINUED | OUTPATIENT
Start: 2020-01-01 | End: 2020-01-01

## 2020-01-01 RX ORDER — DILTIAZEM HYDROCHLORIDE 5 MG/ML
20 INJECTION INTRAVENOUS ONCE
Status: COMPLETED | OUTPATIENT
Start: 2020-01-01 | End: 2020-01-01

## 2020-01-01 RX ORDER — FENTANYL CITRATE 50 UG/ML
INJECTION, SOLUTION INTRAMUSCULAR; INTRAVENOUS
Status: COMPLETED
Start: 2020-01-01 | End: 2020-01-01

## 2020-01-01 RX ORDER — BACITRACIN ZINC AND POLYMYXIN B SULFATE 500; 1000 [USP'U]/G; [USP'U]/G
OINTMENT TOPICAL ONCE
Status: DISCONTINUED | OUTPATIENT
Start: 2020-01-01 | End: 2020-01-01 | Stop reason: HOSPADM

## 2020-01-01 RX ORDER — DULOXETIN HYDROCHLORIDE 60 MG/1
60 CAPSULE, DELAYED RELEASE ORAL DAILY
COMMUNITY

## 2020-01-01 RX ORDER — IPRATROPIUM BROMIDE AND ALBUTEROL SULFATE 2.5; .5 MG/3ML; MG/3ML
1 SOLUTION RESPIRATORY (INHALATION) 4 TIMES DAILY
Status: DISCONTINUED | OUTPATIENT
Start: 2020-01-01 | End: 2020-01-01

## 2020-01-01 RX ORDER — DILTIAZEM HYDROCHLORIDE 5 MG/ML
10 INJECTION INTRAVENOUS ONCE
Status: COMPLETED | OUTPATIENT
Start: 2020-01-01 | End: 2020-01-01

## 2020-01-01 RX ORDER — SODIUM CHLORIDE 450 MG/100ML
INJECTION, SOLUTION INTRAVENOUS CONTINUOUS
Status: DISCONTINUED | OUTPATIENT
Start: 2020-01-01 | End: 2020-01-01

## 2020-01-01 RX ORDER — PANTOPRAZOLE SODIUM 40 MG/10ML
40 INJECTION, POWDER, LYOPHILIZED, FOR SOLUTION INTRAVENOUS DAILY
Status: DISCONTINUED | OUTPATIENT
Start: 2020-01-01 | End: 2020-01-01 | Stop reason: HOSPADM

## 2020-01-01 RX ORDER — OSELTAMIVIR PHOSPHATE 6 MG/ML
75 FOR SUSPENSION ORAL 2 TIMES DAILY
Status: COMPLETED | OUTPATIENT
Start: 2020-01-01 | End: 2020-01-01

## 2020-01-01 RX ADMIN — FENTANYL CITRATE 0.5 MCG/KG/HR: 50 INJECTION, SOLUTION INTRAMUSCULAR; INTRAVENOUS at 23:46

## 2020-01-01 RX ADMIN — Medication 6 PUFF: at 07:13

## 2020-01-01 RX ADMIN — Medication 6 PUFF: at 19:51

## 2020-01-01 RX ADMIN — CHLORHEXIDINE GLUCONATE 15 ML: 1.2 RINSE ORAL at 20:29

## 2020-01-01 RX ADMIN — Medication 6 PUFF: at 04:01

## 2020-01-01 RX ADMIN — METOPROLOL TARTRATE 25 MG: 25 TABLET, FILM COATED ORAL at 08:08

## 2020-01-01 RX ADMIN — SODIUM CHLORIDE SOLN NEBU 3% 3 ML: 3 NEBU SOLN at 16:03

## 2020-01-01 RX ADMIN — PANTOPRAZOLE SODIUM 40 MG: 40 INJECTION, POWDER, LYOPHILIZED, FOR SOLUTION INTRAVENOUS at 09:21

## 2020-01-01 RX ADMIN — PANTOPRAZOLE SODIUM 40 MG: 40 INJECTION, POWDER, LYOPHILIZED, FOR SOLUTION INTRAVENOUS at 07:47

## 2020-01-01 RX ADMIN — PROPOFOL 35 MCG/KG/MIN: 10 INJECTION, EMULSION INTRAVENOUS at 20:16

## 2020-01-01 RX ADMIN — Medication 6 PUFF: at 07:16

## 2020-01-01 RX ADMIN — CHLORHEXIDINE GLUCONATE 15 ML: 1.2 RINSE ORAL at 11:08

## 2020-01-01 RX ADMIN — Medication 6 PUFF: at 19:16

## 2020-01-01 RX ADMIN — Medication 6 PUFF: at 11:43

## 2020-01-01 RX ADMIN — SODIUM CHLORIDE SOLN NEBU 3% 3 ML: 3 NEBU SOLN at 15:28

## 2020-01-01 RX ADMIN — ENOXAPARIN SODIUM 40 MG: 40 INJECTION SUBCUTANEOUS at 08:36

## 2020-01-01 RX ADMIN — CHLORHEXIDINE GLUCONATE 15 ML: 1.2 RINSE ORAL at 09:40

## 2020-01-01 RX ADMIN — CHLORHEXIDINE GLUCONATE 15 ML: 1.2 RINSE ORAL at 08:02

## 2020-01-01 RX ADMIN — CHLORHEXIDINE GLUCONATE 15 ML: 1.2 RINSE ORAL at 20:25

## 2020-01-01 RX ADMIN — DEXTROSE MONOHYDRATE 315 MG: 50 INJECTION, SOLUTION INTRAVENOUS at 09:32

## 2020-01-01 RX ADMIN — Medication 6 PUFF: at 03:39

## 2020-01-01 RX ADMIN — LEVOFLOXACIN 500 MG: 5 INJECTION, SOLUTION INTRAVENOUS at 08:54

## 2020-01-01 RX ADMIN — POTASSIUM CHLORIDE 20 MEQ: 29.8 INJECTION, SOLUTION INTRAVENOUS at 19:36

## 2020-01-01 RX ADMIN — CHLORHEXIDINE GLUCONATE 15 ML: 1.2 RINSE ORAL at 20:40

## 2020-01-01 RX ADMIN — Medication 10 ML: at 20:13

## 2020-01-01 RX ADMIN — FENTANYL CITRATE 0.5 MCG/KG/HR: 50 INJECTION, SOLUTION INTRAMUSCULAR; INTRAVENOUS at 17:04

## 2020-01-01 RX ADMIN — CHLORHEXIDINE GLUCONATE 15 ML: 1.2 RINSE ORAL at 19:42

## 2020-01-01 RX ADMIN — CEFEPIME HYDROCHLORIDE 2 G: 2 INJECTION, POWDER, FOR SOLUTION INTRAVENOUS at 18:56

## 2020-01-01 RX ADMIN — CEFEPIME HYDROCHLORIDE 2 G: 2 INJECTION, POWDER, FOR SOLUTION INTRAVENOUS at 03:20

## 2020-01-01 RX ADMIN — OSELTAMIVIR PHOSPHATE 75 MG: 6 POWDER, FOR SUSPENSION ORAL at 22:14

## 2020-01-01 RX ADMIN — OSELTAMIVIR PHOSPHATE 75 MG: 75 CAPSULE ORAL at 14:03

## 2020-01-01 RX ADMIN — Medication 6 PUFF: at 16:02

## 2020-01-01 RX ADMIN — DEXTROSE MONOHYDRATE 315 MG: 50 INJECTION, SOLUTION INTRAVENOUS at 20:25

## 2020-01-01 RX ADMIN — Medication 6 PUFF: at 07:44

## 2020-01-01 RX ADMIN — PANTOPRAZOLE SODIUM 40 MG: 40 INJECTION, POWDER, LYOPHILIZED, FOR SOLUTION INTRAVENOUS at 08:01

## 2020-01-01 RX ADMIN — LORAZEPAM 1 MG: 2 INJECTION INTRAMUSCULAR; INTRAVENOUS at 15:36

## 2020-01-01 RX ADMIN — FENTANYL CITRATE 0.5 MCG/KG/HR: 50 INJECTION, SOLUTION INTRAMUSCULAR; INTRAVENOUS at 00:04

## 2020-01-01 RX ADMIN — Medication 6 PUFF: at 07:36

## 2020-01-01 RX ADMIN — Medication 6 PUFF: at 15:30

## 2020-01-01 RX ADMIN — ENOXAPARIN SODIUM 40 MG: 40 INJECTION SUBCUTANEOUS at 08:08

## 2020-01-01 RX ADMIN — METOPROLOL TARTRATE 25 MG: 25 TABLET, FILM COATED ORAL at 20:24

## 2020-01-01 RX ADMIN — Medication 6 PUFF: at 19:42

## 2020-01-01 RX ADMIN — Medication 6 PUFF: at 15:28

## 2020-01-01 RX ADMIN — Medication 6 PUFF: at 08:15

## 2020-01-01 RX ADMIN — Medication 6 PUFF: at 23:24

## 2020-01-01 RX ADMIN — PROPOFOL 20 MCG/KG/MIN: 10 INJECTION, EMULSION INTRAVENOUS at 10:45

## 2020-01-01 RX ADMIN — Medication 6 PUFF: at 23:41

## 2020-01-01 RX ADMIN — ALTEPLASE 1 MG: 2.2 INJECTION, POWDER, LYOPHILIZED, FOR SOLUTION INTRAVENOUS at 00:35

## 2020-01-01 RX ADMIN — ENOXAPARIN SODIUM 40 MG: 40 INJECTION SUBCUTANEOUS at 09:23

## 2020-01-01 RX ADMIN — POTASSIUM CHLORIDE 20 MEQ: 29.8 INJECTION, SOLUTION INTRAVENOUS at 14:20

## 2020-01-01 RX ADMIN — DEXTROSE MONOHYDRATE 315 MG: 50 INJECTION, SOLUTION INTRAVENOUS at 21:18

## 2020-01-01 RX ADMIN — PROPOFOL 10 MCG/KG/MIN: 10 INJECTION, EMULSION INTRAVENOUS at 10:20

## 2020-01-01 RX ADMIN — CEFEPIME HYDROCHLORIDE 2 G: 2 INJECTION, POWDER, FOR SOLUTION INTRAVENOUS at 18:44

## 2020-01-01 RX ADMIN — CHLORHEXIDINE GLUCONATE 15 ML: 1.2 RINSE ORAL at 09:21

## 2020-01-01 RX ADMIN — DEXTROSE MONOHYDRATE 315 MG: 50 INJECTION, SOLUTION INTRAVENOUS at 20:32

## 2020-01-01 RX ADMIN — Medication 10 ML: at 20:25

## 2020-01-01 RX ADMIN — Medication 6 PUFF: at 13:08

## 2020-01-01 RX ADMIN — SENNOSIDES AND DOCUSATE SODIUM 2 TABLET: 8.6; 5 TABLET ORAL at 08:01

## 2020-01-01 RX ADMIN — Medication 6 PUFF: at 03:44

## 2020-01-01 RX ADMIN — CHLORHEXIDINE GLUCONATE 15 ML: 1.2 RINSE ORAL at 11:28

## 2020-01-01 RX ADMIN — Medication 6 PUFF: at 00:32

## 2020-01-01 RX ADMIN — Medication 6 PUFF: at 15:37

## 2020-01-01 RX ADMIN — DEXTROSE MONOHYDRATE 315 MG: 50 INJECTION, SOLUTION INTRAVENOUS at 09:33

## 2020-01-01 RX ADMIN — DILTIAZEM HYDROCHLORIDE 5 MG/HR: 5 INJECTION INTRAVENOUS at 04:38

## 2020-01-01 RX ADMIN — METOPROLOL TARTRATE 25 MG: 25 TABLET, FILM COATED ORAL at 09:41

## 2020-01-01 RX ADMIN — SODIUM CHLORIDE SOLN NEBU 3% 3 ML: 3 NEBU SOLN at 08:17

## 2020-01-01 RX ADMIN — ENOXAPARIN SODIUM 40 MG: 40 INJECTION SUBCUTANEOUS at 10:33

## 2020-01-01 RX ADMIN — ACETAMINOPHEN 650 MG: 325 TABLET, FILM COATED ORAL at 18:54

## 2020-01-01 RX ADMIN — PROPOFOL 35 MCG/KG/MIN: 10 INJECTION, EMULSION INTRAVENOUS at 15:55

## 2020-01-01 RX ADMIN — PANTOPRAZOLE SODIUM 40 MG: 40 INJECTION, POWDER, LYOPHILIZED, FOR SOLUTION INTRAVENOUS at 09:24

## 2020-01-01 RX ADMIN — Medication 6 PUFF: at 16:19

## 2020-01-01 RX ADMIN — ENOXAPARIN SODIUM 40 MG: 40 INJECTION SUBCUTANEOUS at 08:54

## 2020-01-01 RX ADMIN — Medication 6 PUFF: at 23:22

## 2020-01-01 RX ADMIN — Medication 6 PUFF: at 23:25

## 2020-01-01 RX ADMIN — FENTANYL CITRATE 0.5 MCG/KG/HR: 50 INJECTION, SOLUTION INTRAMUSCULAR; INTRAVENOUS at 04:38

## 2020-01-01 RX ADMIN — CHLORHEXIDINE GLUCONATE 15 ML: 1.2 RINSE ORAL at 20:03

## 2020-01-01 RX ADMIN — DEXTROSE MONOHYDRATE 315 MG: 50 INJECTION, SOLUTION INTRAVENOUS at 10:42

## 2020-01-01 RX ADMIN — PROPOFOL 5 MCG/KG/MIN: 10 INJECTION, EMULSION INTRAVENOUS at 04:28

## 2020-01-01 RX ADMIN — Medication 6 PUFF: at 08:03

## 2020-01-01 RX ADMIN — Medication 0.03 MCG/KG/MIN: at 01:20

## 2020-01-01 RX ADMIN — PROPOFOL 20 MCG/KG/MIN: 10 INJECTION, EMULSION INTRAVENOUS at 12:20

## 2020-01-01 RX ADMIN — Medication 0.03 MCG/KG/MIN: at 10:30

## 2020-01-01 RX ADMIN — PROPOFOL 40 MCG/KG/MIN: 10 INJECTION, EMULSION INTRAVENOUS at 20:57

## 2020-01-01 RX ADMIN — Medication 6 PUFF: at 19:25

## 2020-01-01 RX ADMIN — FENTANYL CITRATE 100 MCG: 50 INJECTION, SOLUTION INTRAMUSCULAR; INTRAVENOUS at 15:42

## 2020-01-01 RX ADMIN — Medication 240 ML: at 10:50

## 2020-01-01 RX ADMIN — OSELTAMIVIR PHOSPHATE 75 MG: 6 POWDER, FOR SUSPENSION ORAL at 20:09

## 2020-01-01 RX ADMIN — PROPOFOL 30 MCG/KG/MIN: 10 INJECTION, EMULSION INTRAVENOUS at 21:28

## 2020-01-01 RX ADMIN — SODIUM CHLORIDE: 9 INJECTION, SOLUTION INTRAVENOUS at 17:26

## 2020-01-01 RX ADMIN — FENTANYL CITRATE 0.5 MCG/KG/HR: 50 INJECTION, SOLUTION INTRAMUSCULAR; INTRAVENOUS at 08:54

## 2020-01-01 RX ADMIN — SODIUM PHOSPHATE, MONOBASIC, MONOHYDRATE 18.33 MMOL: 276; 142 INJECTION, SOLUTION INTRAVENOUS at 20:42

## 2020-01-01 RX ADMIN — DEXTROSE MONOHYDRATE 315 MG: 50 INJECTION, SOLUTION INTRAVENOUS at 21:26

## 2020-01-01 RX ADMIN — CEFEPIME HYDROCHLORIDE 2 G: 2 INJECTION, POWDER, FOR SOLUTION INTRAVENOUS at 19:49

## 2020-01-01 RX ADMIN — SODIUM CHLORIDE SOLN NEBU 3% 3 ML: 3 NEBU SOLN at 16:17

## 2020-01-01 RX ADMIN — Medication 6 PUFF: at 03:22

## 2020-01-01 RX ADMIN — SODIUM CHLORIDE: 9 INJECTION, SOLUTION INTRAVENOUS at 16:27

## 2020-01-01 RX ADMIN — POTASSIUM CHLORIDE 20 MEQ: 29.8 INJECTION, SOLUTION INTRAVENOUS at 20:54

## 2020-01-01 RX ADMIN — Medication 6 PUFF: at 11:16

## 2020-01-01 RX ADMIN — Medication 6 PUFF: at 12:33

## 2020-01-01 RX ADMIN — Medication 6 PUFF: at 19:48

## 2020-01-01 RX ADMIN — Medication 6 PUFF: at 11:38

## 2020-01-01 RX ADMIN — OSELTAMIVIR PHOSPHATE 75 MG: 6 POWDER, FOR SUSPENSION ORAL at 08:49

## 2020-01-01 RX ADMIN — PANTOPRAZOLE SODIUM 40 MG: 40 INJECTION, POWDER, LYOPHILIZED, FOR SOLUTION INTRAVENOUS at 08:25

## 2020-01-01 RX ADMIN — SODIUM CHLORIDE: 9 INJECTION, SOLUTION INTRAVENOUS at 02:08

## 2020-01-01 RX ADMIN — SODIUM PHOSPHATE, MONOBASIC, MONOHYDRATE 36.66 MMOL: 276; 142 INJECTION, SOLUTION INTRAVENOUS at 06:45

## 2020-01-01 RX ADMIN — SODIUM CHLORIDE SOLN NEBU 3% 3 ML: 3 NEBU SOLN at 13:00

## 2020-01-01 RX ADMIN — SODIUM CHLORIDE SOLN NEBU 3% 3 ML: 3 NEBU SOLN at 15:21

## 2020-01-01 RX ADMIN — CHLORHEXIDINE GLUCONATE 15 ML: 1.2 RINSE ORAL at 19:47

## 2020-01-01 RX ADMIN — SENNOSIDES AND DOCUSATE SODIUM 2 TABLET: 8.6; 5 TABLET ORAL at 11:07

## 2020-01-01 RX ADMIN — PROPOFOL 20 MCG/KG/MIN: 10 INJECTION, EMULSION INTRAVENOUS at 20:04

## 2020-01-01 RX ADMIN — IPRATROPIUM BROMIDE AND ALBUTEROL SULFATE 1 AMPULE: .5; 3 SOLUTION RESPIRATORY (INHALATION) at 06:59

## 2020-01-01 RX ADMIN — IPRATROPIUM BROMIDE AND ALBUTEROL SULFATE 1 AMPULE: .5; 3 SOLUTION RESPIRATORY (INHALATION) at 07:18

## 2020-01-01 RX ADMIN — SODIUM PHOSPHATE, MONOBASIC, MONOHYDRATE 18.33 MMOL: 276; 142 INJECTION, SOLUTION INTRAVENOUS at 07:26

## 2020-01-01 RX ADMIN — POTASSIUM CHLORIDE 20 MEQ: 29.8 INJECTION, SOLUTION INTRAVENOUS at 08:53

## 2020-01-01 RX ADMIN — SENNOSIDES AND DOCUSATE SODIUM 2 TABLET: 8.6; 5 TABLET ORAL at 08:31

## 2020-01-01 RX ADMIN — PROPOFOL 35 MCG/KG/MIN: 10 INJECTION, EMULSION INTRAVENOUS at 04:29

## 2020-01-01 RX ADMIN — SODIUM CHLORIDE SOLN NEBU 3% 3 ML: 3 NEBU SOLN at 19:16

## 2020-01-01 RX ADMIN — SODIUM CHLORIDE SOLN NEBU 3% 3 ML: 3 NEBU SOLN at 07:45

## 2020-01-01 RX ADMIN — Medication 6 PUFF: at 08:16

## 2020-01-01 RX ADMIN — CEFEPIME HYDROCHLORIDE 2 G: 2 INJECTION, POWDER, FOR SOLUTION INTRAVENOUS at 03:59

## 2020-01-01 RX ADMIN — SODIUM CHLORIDE: 9 INJECTION, SOLUTION INTRAVENOUS at 00:37

## 2020-01-01 RX ADMIN — SODIUM CHLORIDE SOLN NEBU 3% 3 ML: 3 NEBU SOLN at 08:14

## 2020-01-01 RX ADMIN — POTASSIUM CHLORIDE 20 MEQ: 29.8 INJECTION, SOLUTION INTRAVENOUS at 12:27

## 2020-01-01 RX ADMIN — CHLORHEXIDINE GLUCONATE 15 ML: 1.2 RINSE ORAL at 08:09

## 2020-01-01 RX ADMIN — CHLORHEXIDINE GLUCONATE 15 ML: 1.2 RINSE ORAL at 09:23

## 2020-01-01 RX ADMIN — CEFEPIME HYDROCHLORIDE 2 G: 2 INJECTION, POWDER, FOR SOLUTION INTRAVENOUS at 03:46

## 2020-01-01 RX ADMIN — PROPOFOL 35 MCG/KG/MIN: 10 INJECTION, EMULSION INTRAVENOUS at 19:46

## 2020-01-01 RX ADMIN — ACETAMINOPHEN 650 MG: 325 TABLET, FILM COATED ORAL at 12:25

## 2020-01-01 RX ADMIN — Medication 6 PUFF: at 19:11

## 2020-01-01 RX ADMIN — Medication 6 PUFF: at 16:23

## 2020-01-01 RX ADMIN — PANTOPRAZOLE SODIUM 40 MG: 40 INJECTION, POWDER, LYOPHILIZED, FOR SOLUTION INTRAVENOUS at 08:53

## 2020-01-01 RX ADMIN — PROPOFOL 35 MCG/KG/MIN: 10 INJECTION, EMULSION INTRAVENOUS at 23:26

## 2020-01-01 RX ADMIN — POTASSIUM CHLORIDE 20 MEQ: 29.8 INJECTION, SOLUTION INTRAVENOUS at 06:55

## 2020-01-01 RX ADMIN — POTASSIUM CHLORIDE 20 MEQ: 29.8 INJECTION, SOLUTION INTRAVENOUS at 06:57

## 2020-01-01 RX ADMIN — Medication 6 PUFF: at 11:42

## 2020-01-01 RX ADMIN — Medication 10 ML: at 19:47

## 2020-01-01 RX ADMIN — FENTANYL CITRATE 0.5 MCG/KG/HR: 50 INJECTION, SOLUTION INTRAMUSCULAR; INTRAVENOUS at 04:22

## 2020-01-01 RX ADMIN — PANTOPRAZOLE SODIUM 40 MG: 40 INJECTION, POWDER, LYOPHILIZED, FOR SOLUTION INTRAVENOUS at 08:09

## 2020-01-01 RX ADMIN — SODIUM CHLORIDE: 9 INJECTION, SOLUTION INTRAVENOUS at 06:22

## 2020-01-01 RX ADMIN — Medication 6 PUFF: at 11:18

## 2020-01-01 RX ADMIN — DEXTROSE MONOHYDRATE 315 MG: 50 INJECTION, SOLUTION INTRAVENOUS at 11:00

## 2020-01-01 RX ADMIN — OSELTAMIVIR PHOSPHATE 75 MG: 75 CAPSULE ORAL at 19:44

## 2020-01-01 RX ADMIN — SODIUM CHLORIDE: 9 INJECTION, SOLUTION INTRAVENOUS at 21:55

## 2020-01-01 RX ADMIN — PROPOFOL 35 MCG/KG/MIN: 10 INJECTION, EMULSION INTRAVENOUS at 08:54

## 2020-01-01 RX ADMIN — FENTANYL CITRATE 0.5 MCG/KG/HR: 50 INJECTION, SOLUTION INTRAMUSCULAR; INTRAVENOUS at 05:28

## 2020-01-01 RX ADMIN — Medication 10 ML: at 08:09

## 2020-01-01 RX ADMIN — Medication 10 ML: at 10:44

## 2020-01-01 RX ADMIN — CHLORHEXIDINE GLUCONATE 15 ML: 1.2 RINSE ORAL at 20:43

## 2020-01-01 RX ADMIN — Medication 6 PUFF: at 03:30

## 2020-01-01 RX ADMIN — PROPOFOL 40 MCG/KG/MIN: 10 INJECTION, EMULSION INTRAVENOUS at 20:55

## 2020-01-01 RX ADMIN — Medication 6 PUFF: at 23:23

## 2020-01-01 RX ADMIN — Medication 6 PUFF: at 19:29

## 2020-01-01 RX ADMIN — Medication 6 PUFF: at 11:57

## 2020-01-01 RX ADMIN — VANCOMYCIN HYDROCHLORIDE 1500 MG: 10 INJECTION, POWDER, LYOPHILIZED, FOR SOLUTION INTRAVENOUS at 20:12

## 2020-01-01 RX ADMIN — Medication 6 PUFF: at 15:50

## 2020-01-01 RX ADMIN — Medication 6 PUFF: at 23:13

## 2020-01-01 RX ADMIN — DEXTROSE MONOHYDRATE 315 MG: 50 INJECTION, SOLUTION INTRAVENOUS at 11:24

## 2020-01-01 RX ADMIN — Medication 6 PUFF: at 15:21

## 2020-01-01 RX ADMIN — Medication 10 ML: at 20:43

## 2020-01-01 RX ADMIN — METOPROLOL TARTRATE 25 MG: 25 TABLET, FILM COATED ORAL at 09:22

## 2020-01-01 RX ADMIN — ENOXAPARIN SODIUM 40 MG: 40 INJECTION SUBCUTANEOUS at 19:45

## 2020-01-01 RX ADMIN — SODIUM CHLORIDE: 4.5 INJECTION, SOLUTION INTRAVENOUS at 20:55

## 2020-01-01 RX ADMIN — CEFEPIME HYDROCHLORIDE 2 G: 2 INJECTION, POWDER, FOR SOLUTION INTRAVENOUS at 03:16

## 2020-01-01 RX ADMIN — PANTOPRAZOLE SODIUM 40 MG: 40 INJECTION, POWDER, LYOPHILIZED, FOR SOLUTION INTRAVENOUS at 08:31

## 2020-01-01 RX ADMIN — POLYETHYLENE GLYCOL 3350 17 G: 17 POWDER, FOR SOLUTION ORAL at 08:54

## 2020-01-01 RX ADMIN — Medication 10 ML: at 19:49

## 2020-01-01 RX ADMIN — METOPROLOL TARTRATE 25 MG: 25 TABLET, FILM COATED ORAL at 20:55

## 2020-01-01 RX ADMIN — ENOXAPARIN SODIUM 40 MG: 40 INJECTION SUBCUTANEOUS at 08:01

## 2020-01-01 RX ADMIN — OSELTAMIVIR PHOSPHATE 75 MG: 6 POWDER, FOR SUSPENSION ORAL at 20:20

## 2020-01-01 RX ADMIN — CHLORHEXIDINE GLUCONATE 15 ML: 1.2 RINSE ORAL at 08:24

## 2020-01-01 RX ADMIN — DILTIAZEM HYDROCHLORIDE 20 MG: 5 INJECTION INTRAVENOUS at 09:41

## 2020-01-01 RX ADMIN — Medication 6 PUFF: at 15:27

## 2020-01-01 RX ADMIN — PROPOFOL 40 MCG/KG/MIN: 10 INJECTION, EMULSION INTRAVENOUS at 17:23

## 2020-01-01 RX ADMIN — Medication 6 PUFF: at 23:47

## 2020-01-01 RX ADMIN — Medication 6 PUFF: at 19:37

## 2020-01-01 RX ADMIN — Medication 10 ML: at 09:24

## 2020-01-01 RX ADMIN — Medication 10 ML: at 08:53

## 2020-01-01 RX ADMIN — LEVOFLOXACIN 500 MG: 5 INJECTION, SOLUTION INTRAVENOUS at 08:53

## 2020-01-01 RX ADMIN — Medication 6 PUFF: at 03:21

## 2020-01-01 RX ADMIN — Medication 6 PUFF: at 16:22

## 2020-01-01 RX ADMIN — FENTANYL CITRATE 50 MCG: 50 INJECTION, SOLUTION INTRAMUSCULAR; INTRAVENOUS at 15:30

## 2020-01-01 RX ADMIN — PROPOFOL 30 MCG/KG/MIN: 10 INJECTION, EMULSION INTRAVENOUS at 13:05

## 2020-01-01 RX ADMIN — Medication 10 ML: at 19:46

## 2020-01-01 RX ADMIN — PANTOPRAZOLE SODIUM 40 MG: 40 INJECTION, POWDER, LYOPHILIZED, FOR SOLUTION INTRAVENOUS at 09:34

## 2020-01-01 RX ADMIN — Medication 10 ML: at 08:25

## 2020-01-01 RX ADMIN — PROPOFOL 25 MCG/KG/MIN: 10 INJECTION, EMULSION INTRAVENOUS at 05:28

## 2020-01-01 RX ADMIN — PROPOFOL 35 MCG/KG/MIN: 10 INJECTION, EMULSION INTRAVENOUS at 05:00

## 2020-01-01 RX ADMIN — PROPOFOL 25 MCG/KG/MIN: 10 INJECTION, EMULSION INTRAVENOUS at 08:08

## 2020-01-01 RX ADMIN — Medication 6 PUFF: at 11:07

## 2020-01-01 RX ADMIN — Medication 6 PUFF: at 07:51

## 2020-01-01 RX ADMIN — FENTANYL CITRATE 0.5 MCG/KG/HR: 50 INJECTION, SOLUTION INTRAMUSCULAR; INTRAVENOUS at 03:09

## 2020-01-01 RX ADMIN — DEXTROSE MONOHYDRATE 315 MG: 50 INJECTION, SOLUTION INTRAVENOUS at 10:08

## 2020-01-01 RX ADMIN — IPRATROPIUM BROMIDE AND ALBUTEROL SULFATE 1 AMPULE: .5; 3 SOLUTION RESPIRATORY (INHALATION) at 15:15

## 2020-01-01 RX ADMIN — LEVOFLOXACIN 500 MG: 5 INJECTION, SOLUTION INTRAVENOUS at 08:30

## 2020-01-01 RX ADMIN — Medication 10 ML: at 20:03

## 2020-01-01 RX ADMIN — Medication 10 ML: at 20:12

## 2020-01-01 RX ADMIN — SODIUM CHLORIDE SOLN NEBU 3% 3 ML: 3 NEBU SOLN at 15:38

## 2020-01-01 RX ADMIN — Medication 10 ML: at 08:36

## 2020-01-01 RX ADMIN — LACTULOSE 20 G: 20 SOLUTION ORAL at 11:07

## 2020-01-01 RX ADMIN — Medication 6 PUFF: at 11:58

## 2020-01-01 RX ADMIN — SODIUM CHLORIDE SOLN NEBU 3% 3 ML: 3 NEBU SOLN at 13:09

## 2020-01-01 RX ADMIN — Medication 16 MG: at 10:30

## 2020-01-01 RX ADMIN — Medication 6 PUFF: at 11:29

## 2020-01-01 RX ADMIN — SODIUM CHLORIDE SOLN NEBU 3% 3 ML: 3 NEBU SOLN at 07:28

## 2020-01-01 RX ADMIN — PROPOFOL 35 MCG/KG/MIN: 10 INJECTION, EMULSION INTRAVENOUS at 19:26

## 2020-01-01 RX ADMIN — SODIUM CHLORIDE: 9 INJECTION, SOLUTION INTRAVENOUS at 17:32

## 2020-01-01 RX ADMIN — METOPROLOL TARTRATE 25 MG: 25 TABLET, FILM COATED ORAL at 08:53

## 2020-01-01 RX ADMIN — ENOXAPARIN SODIUM 40 MG: 40 INJECTION SUBCUTANEOUS at 08:30

## 2020-01-01 RX ADMIN — PROPOFOL 35 MCG/KG/MIN: 10 INJECTION, EMULSION INTRAVENOUS at 12:50

## 2020-01-01 RX ADMIN — PROPOFOL 40 MCG/KG/MIN: 10 INJECTION, EMULSION INTRAVENOUS at 00:38

## 2020-01-01 RX ADMIN — SODIUM CHLORIDE SOLN NEBU 3% 3 ML: 3 NEBU SOLN at 11:56

## 2020-01-01 RX ADMIN — Medication 6 PUFF: at 07:05

## 2020-01-01 RX ADMIN — SENNOSIDES AND DOCUSATE SODIUM 2 TABLET: 8.6; 5 TABLET ORAL at 08:53

## 2020-01-01 RX ADMIN — Medication 10 ML: at 14:03

## 2020-01-01 RX ADMIN — Medication 6 PUFF: at 00:39

## 2020-01-01 RX ADMIN — METOPROLOL TARTRATE 25 MG: 25 TABLET, FILM COATED ORAL at 08:30

## 2020-01-01 RX ADMIN — PROPOFOL 40 MCG/KG/MIN: 10 INJECTION, EMULSION INTRAVENOUS at 01:12

## 2020-01-01 RX ADMIN — METOPROLOL TARTRATE 25 MG: 25 TABLET, FILM COATED ORAL at 11:07

## 2020-01-01 RX ADMIN — PROPOFOL 35 MCG/KG/MIN: 10 INJECTION, EMULSION INTRAVENOUS at 11:45

## 2020-01-01 RX ADMIN — IPRATROPIUM BROMIDE AND ALBUTEROL SULFATE 1 AMPULE: .5; 3 SOLUTION RESPIRATORY (INHALATION) at 19:52

## 2020-01-01 RX ADMIN — Medication 6 PUFF: at 15:53

## 2020-01-01 RX ADMIN — Medication 6 PUFF: at 03:26

## 2020-01-01 RX ADMIN — Medication 6 PUFF: at 15:51

## 2020-01-01 RX ADMIN — METOPROLOL TARTRATE 25 MG: 25 TABLET, FILM COATED ORAL at 20:25

## 2020-01-01 RX ADMIN — PROPOFOL 40 MCG/KG/MIN: 10 INJECTION, EMULSION INTRAVENOUS at 23:31

## 2020-01-01 RX ADMIN — PROPOFOL 40 MCG/KG/MIN: 10 INJECTION, EMULSION INTRAVENOUS at 03:20

## 2020-01-01 RX ADMIN — Medication 6 PUFF: at 03:18

## 2020-01-01 RX ADMIN — LEVOFLOXACIN 500 MG: 5 INJECTION, SOLUTION INTRAVENOUS at 09:34

## 2020-01-01 RX ADMIN — CEFAZOLIN 2 G: 1 INJECTION, POWDER, FOR SOLUTION INTRAMUSCULAR; INTRAVENOUS at 12:08

## 2020-01-01 RX ADMIN — ENOXAPARIN SODIUM 40 MG: 40 INJECTION SUBCUTANEOUS at 08:09

## 2020-01-01 RX ADMIN — ENOXAPARIN SODIUM 40 MG: 40 INJECTION SUBCUTANEOUS at 08:24

## 2020-01-01 RX ADMIN — PANTOPRAZOLE SODIUM 40 MG: 40 INJECTION, POWDER, LYOPHILIZED, FOR SOLUTION INTRAVENOUS at 14:02

## 2020-01-01 RX ADMIN — SODIUM CHLORIDE SOLN NEBU 3% 3 ML: 3 NEBU SOLN at 08:03

## 2020-01-01 RX ADMIN — Medication 10 ML: at 08:56

## 2020-01-01 RX ADMIN — Medication 10 ML: at 08:31

## 2020-01-01 RX ADMIN — Medication 6 PUFF: at 08:23

## 2020-01-01 RX ADMIN — Medication 10 ML: at 08:16

## 2020-01-01 RX ADMIN — CHLORHEXIDINE GLUCONATE 15 ML: 1.2 RINSE ORAL at 20:51

## 2020-01-01 RX ADMIN — FENTANYL CITRATE 0.5 MCG/KG/HR: 50 INJECTION, SOLUTION INTRAMUSCULAR; INTRAVENOUS at 17:14

## 2020-01-01 RX ADMIN — OSELTAMIVIR PHOSPHATE 75 MG: 6 POWDER, FOR SUSPENSION ORAL at 09:36

## 2020-01-01 RX ADMIN — POLYETHYLENE GLYCOL 3350 17 G: 17 POWDER, FOR SOLUTION ORAL at 09:41

## 2020-01-01 RX ADMIN — PROPOFOL 30 MCG/KG/MIN: 10 INJECTION, EMULSION INTRAVENOUS at 19:05

## 2020-01-01 RX ADMIN — LACTULOSE 20 G: 20 SOLUTION ORAL at 09:20

## 2020-01-01 RX ADMIN — OSELTAMIVIR PHOSPHATE 75 MG: 6 POWDER, FOR SUSPENSION ORAL at 09:35

## 2020-01-01 RX ADMIN — Medication 6 PUFF: at 03:14

## 2020-01-01 RX ADMIN — IPRATROPIUM BROMIDE AND ALBUTEROL SULFATE 1 AMPULE: .5; 3 SOLUTION RESPIRATORY (INHALATION) at 19:37

## 2020-01-01 RX ADMIN — CEFEPIME HYDROCHLORIDE 2 G: 2 INJECTION, POWDER, FOR SOLUTION INTRAVENOUS at 20:12

## 2020-01-01 RX ADMIN — PROPOFOL 25 MCG/KG/MIN: 10 INJECTION, EMULSION INTRAVENOUS at 13:01

## 2020-01-01 RX ADMIN — PROPOFOL 35 MCG/KG/MIN: 10 INJECTION, EMULSION INTRAVENOUS at 00:05

## 2020-01-01 RX ADMIN — SODIUM CHLORIDE SOLN NEBU 3% 3 ML: 3 NEBU SOLN at 19:38

## 2020-01-01 RX ADMIN — ENOXAPARIN SODIUM 40 MG: 40 INJECTION SUBCUTANEOUS at 09:35

## 2020-01-01 RX ADMIN — DILTIAZEM HYDROCHLORIDE 10 MG: 5 INJECTION INTRAVENOUS at 09:57

## 2020-01-01 RX ADMIN — SENNOSIDES AND DOCUSATE SODIUM 2 TABLET: 8.6; 5 TABLET ORAL at 07:47

## 2020-01-01 RX ADMIN — DEXTROSE MONOHYDRATE 315 MG: 50 INJECTION, SOLUTION INTRAVENOUS at 21:46

## 2020-01-01 RX ADMIN — IPRATROPIUM BROMIDE AND ALBUTEROL SULFATE 1 AMPULE: .5; 3 SOLUTION RESPIRATORY (INHALATION) at 11:00

## 2020-01-01 RX ADMIN — SODIUM CHLORIDE SOLN NEBU 3% 3 ML: 3 NEBU SOLN at 19:25

## 2020-01-01 RX ADMIN — Medication 6 PUFF: at 00:38

## 2020-01-01 RX ADMIN — CHLORHEXIDINE GLUCONATE 15 ML: 1.2 RINSE ORAL at 08:10

## 2020-01-01 RX ADMIN — PROPOFOL 35 MCG/KG/MIN: 10 INJECTION, EMULSION INTRAVENOUS at 15:36

## 2020-01-01 RX ADMIN — PROPOFOL 40 MCG/KG/MIN: 10 INJECTION, EMULSION INTRAVENOUS at 07:02

## 2020-01-01 RX ADMIN — Medication 6 PUFF: at 19:36

## 2020-01-01 RX ADMIN — Medication 6 PUFF: at 19:24

## 2020-01-01 RX ADMIN — LACTULOSE 20 G: 20 SOLUTION ORAL at 20:24

## 2020-01-01 RX ADMIN — SODIUM CHLORIDE SOLN NEBU 3% 4 ML: 3 NEBU SOLN at 19:10

## 2020-01-01 RX ADMIN — VANCOMYCIN HYDROCHLORIDE 1500 MG: 10 INJECTION, POWDER, LYOPHILIZED, FOR SOLUTION INTRAVENOUS at 07:50

## 2020-01-01 RX ADMIN — CEFEPIME HYDROCHLORIDE 2 G: 2 INJECTION, POWDER, FOR SOLUTION INTRAVENOUS at 13:57

## 2020-01-01 RX ADMIN — SENNOSIDES AND DOCUSATE SODIUM 2 TABLET: 8.6; 5 TABLET ORAL at 09:20

## 2020-01-01 RX ADMIN — ALTEPLASE 1 MG: 2.2 INJECTION, POWDER, LYOPHILIZED, FOR SOLUTION INTRAVENOUS at 22:37

## 2020-01-01 RX ADMIN — POTASSIUM CHLORIDE 20 MEQ: 29.8 INJECTION, SOLUTION INTRAVENOUS at 06:20

## 2020-01-01 RX ADMIN — PROPOFOL 30 MCG/KG/MIN: 10 INJECTION, EMULSION INTRAVENOUS at 08:43

## 2020-01-01 RX ADMIN — PROPOFOL 20 MCG/KG/MIN: 10 INJECTION, EMULSION INTRAVENOUS at 01:22

## 2020-01-01 RX ADMIN — CHLORHEXIDINE GLUCONATE 15 ML: 1.2 RINSE ORAL at 08:54

## 2020-01-01 RX ADMIN — Medication 10 ML: at 21:02

## 2020-01-01 RX ADMIN — ACETAMINOPHEN 650 MG: 325 TABLET, FILM COATED ORAL at 23:34

## 2020-01-01 RX ADMIN — PROPOFOL 35 MCG/KG/MIN: 10 INJECTION, EMULSION INTRAVENOUS at 03:19

## 2020-01-01 RX ADMIN — Medication 10 ML: at 08:02

## 2020-01-01 RX ADMIN — PANTOPRAZOLE SODIUM 40 MG: 40 INJECTION, POWDER, LYOPHILIZED, FOR SOLUTION INTRAVENOUS at 11:07

## 2020-01-01 RX ADMIN — PROPOFOL 30 MCG/KG/MIN: 10 INJECTION, EMULSION INTRAVENOUS at 10:48

## 2020-01-01 RX ADMIN — POTASSIUM CHLORIDE 20 MEQ: 29.8 INJECTION, SOLUTION INTRAVENOUS at 05:40

## 2020-01-01 RX ADMIN — SENNOSIDES AND DOCUSATE SODIUM 2 TABLET: 8.6; 5 TABLET ORAL at 09:24

## 2020-01-01 RX ADMIN — PROPOFOL 40 MCG/KG/MIN: 10 INJECTION, EMULSION INTRAVENOUS at 18:04

## 2020-01-01 RX ADMIN — CEFEPIME HYDROCHLORIDE 2 G: 2 INJECTION, POWDER, FOR SOLUTION INTRAVENOUS at 11:29

## 2020-01-01 RX ADMIN — ENOXAPARIN SODIUM 40 MG: 40 INJECTION SUBCUTANEOUS at 08:53

## 2020-01-01 RX ADMIN — SODIUM CHLORIDE SOLN NEBU 3% 3 ML: 3 NEBU SOLN at 19:29

## 2020-01-01 RX ADMIN — POTASSIUM CHLORIDE 20 MEQ: 29.8 INJECTION, SOLUTION INTRAVENOUS at 18:40

## 2020-01-01 RX ADMIN — FENTANYL CITRATE 0.5 MCG/KG/HR: 50 INJECTION, SOLUTION INTRAMUSCULAR; INTRAVENOUS at 11:53

## 2020-01-01 RX ADMIN — Medication 10 ML: at 11:08

## 2020-01-01 RX ADMIN — CHLORHEXIDINE GLUCONATE 15 ML: 1.2 RINSE ORAL at 08:31

## 2020-01-01 RX ADMIN — CHLORHEXIDINE GLUCONATE 15 ML: 1.2 RINSE ORAL at 21:02

## 2020-01-01 RX ADMIN — CEFEPIME HYDROCHLORIDE 2 G: 2 INJECTION, POWDER, FOR SOLUTION INTRAVENOUS at 03:01

## 2020-01-01 RX ADMIN — Medication 6 PUFF: at 08:24

## 2020-01-01 RX ADMIN — Medication 10 ML: at 09:35

## 2020-01-01 RX ADMIN — SODIUM CHLORIDE SOLN NEBU 3% 3 ML: 3 NEBU SOLN at 07:12

## 2020-01-01 RX ADMIN — DEXTROSE MONOHYDRATE 315 MG: 50 INJECTION, SOLUTION INTRAVENOUS at 21:20

## 2020-01-01 RX ADMIN — VANCOMYCIN HYDROCHLORIDE 1500 MG: 10 INJECTION, POWDER, LYOPHILIZED, FOR SOLUTION INTRAVENOUS at 19:46

## 2020-01-01 RX ADMIN — SODIUM CHLORIDE SOLN NEBU 3% 3 ML: 3 NEBU SOLN at 10:15

## 2020-01-01 RX ADMIN — PROPOFOL 20 MCG/KG/MIN: 10 INJECTION, EMULSION INTRAVENOUS at 03:31

## 2020-01-01 RX ADMIN — Medication 6 PUFF: at 03:15

## 2020-01-01 RX ADMIN — DEXTROSE MONOHYDRATE 470 MG: 50 INJECTION, SOLUTION INTRAVENOUS at 10:53

## 2020-01-01 RX ADMIN — FENTANYL CITRATE 0.5 MCG/KG/HR: 50 INJECTION, SOLUTION INTRAMUSCULAR; INTRAVENOUS at 13:16

## 2020-01-01 RX ADMIN — Medication 6 PUFF: at 23:14

## 2020-01-01 RX ADMIN — DEXTROSE MONOHYDRATE 315 MG: 50 INJECTION, SOLUTION INTRAVENOUS at 10:46

## 2020-01-01 RX ADMIN — PROPOFOL 35 MCG/KG/MIN: 10 INJECTION, EMULSION INTRAVENOUS at 16:44

## 2020-01-01 RX ADMIN — CEFAZOLIN 2 G: 1 INJECTION, POWDER, FOR SOLUTION INTRAMUSCULAR; INTRAVENOUS at 13:03

## 2020-01-01 RX ADMIN — FENTANYL CITRATE 0.5 MCG/KG/HR: 50 INJECTION, SOLUTION INTRAMUSCULAR; INTRAVENOUS at 13:06

## 2020-01-01 RX ADMIN — Medication 6 PUFF: at 23:48

## 2020-01-01 RX ADMIN — OSELTAMIVIR PHOSPHATE 75 MG: 6 POWDER, FOR SUSPENSION ORAL at 11:44

## 2020-01-01 RX ADMIN — DEXTROSE MONOHYDRATE 470 MG: 50 INJECTION, SOLUTION INTRAVENOUS at 21:44

## 2020-01-01 RX ADMIN — PROPOFOL 30 MCG/KG/MIN: 10 INJECTION, EMULSION INTRAVENOUS at 06:20

## 2020-01-01 RX ADMIN — CEFAZOLIN 2 G: 1 INJECTION, POWDER, FOR SOLUTION INTRAMUSCULAR; INTRAVENOUS at 04:11

## 2020-01-01 RX ADMIN — DEXTROSE MONOHYDRATE 315 MG: 50 INJECTION, SOLUTION INTRAVENOUS at 11:13

## 2020-01-01 RX ADMIN — SODIUM CHLORIDE SOLN NEBU 3% 15 ML: 3 NEBU SOLN at 23:40

## 2020-01-01 RX ADMIN — LEVOFLOXACIN 500 MG: 5 INJECTION, SOLUTION INTRAVENOUS at 08:25

## 2020-01-01 RX ADMIN — PROPOFOL 15 MCG/KG/MIN: 10 INJECTION, EMULSION INTRAVENOUS at 06:19

## 2020-01-01 RX ADMIN — CEFAZOLIN 2 G: 1 INJECTION, POWDER, FOR SOLUTION INTRAMUSCULAR; INTRAVENOUS at 20:23

## 2020-01-01 RX ADMIN — CHLORHEXIDINE GLUCONATE 15 ML: 1.2 RINSE ORAL at 21:52

## 2020-01-01 RX ADMIN — FENTANYL CITRATE 0.5 MCG/KG/HR: 50 INJECTION, SOLUTION INTRAMUSCULAR; INTRAVENOUS at 23:09

## 2020-01-01 RX ADMIN — DILTIAZEM HYDROCHLORIDE 5 MG/HR: 5 INJECTION INTRAVENOUS at 11:22

## 2020-01-01 RX ADMIN — CEFEPIME HYDROCHLORIDE 2 G: 2 INJECTION, POWDER, FOR SOLUTION INTRAVENOUS at 19:04

## 2020-01-01 RX ADMIN — POTASSIUM CHLORIDE 20 MEQ: 29.8 INJECTION, SOLUTION INTRAVENOUS at 17:35

## 2020-01-01 RX ADMIN — POTASSIUM CHLORIDE 20 MEQ: 29.8 INJECTION, SOLUTION INTRAVENOUS at 05:18

## 2020-01-01 RX ADMIN — PROPOFOL 40 MCG/KG/MIN: 10 INJECTION, EMULSION INTRAVENOUS at 15:33

## 2020-01-01 RX ADMIN — OSELTAMIVIR PHOSPHATE 75 MG: 75 CAPSULE ORAL at 08:36

## 2020-01-01 RX ADMIN — SODIUM CHLORIDE: 4.5 INJECTION, SOLUTION INTRAVENOUS at 06:21

## 2020-01-01 RX ADMIN — PROPOFOL 20 MCG/KG/MIN: 10 INJECTION, EMULSION INTRAVENOUS at 08:44

## 2020-01-01 RX ADMIN — CEFEPIME HYDROCHLORIDE 2 G: 2 INJECTION, POWDER, FOR SOLUTION INTRAVENOUS at 15:19

## 2020-01-01 RX ADMIN — Medication 6 PUFF: at 16:25

## 2020-01-01 RX ADMIN — DILTIAZEM HYDROCHLORIDE 5 MG/HR: 5 INJECTION INTRAVENOUS at 11:08

## 2020-01-01 RX ADMIN — CHLORHEXIDINE GLUCONATE 15 ML: 1.2 RINSE ORAL at 08:55

## 2020-01-01 RX ADMIN — FENTANYL CITRATE 0.5 MCG/KG/HR: 50 INJECTION, SOLUTION INTRAMUSCULAR; INTRAVENOUS at 04:28

## 2020-01-01 RX ADMIN — Medication 10 ML: at 20:51

## 2020-01-01 RX ADMIN — Medication 0.05 MCG/KG/MIN: at 15:19

## 2020-01-01 RX ADMIN — PROPOFOL 5 MCG/KG/MIN: 10 INJECTION, EMULSION INTRAVENOUS at 16:31

## 2020-01-01 RX ADMIN — OSELTAMIVIR PHOSPHATE 75 MG: 75 CAPSULE ORAL at 20:12

## 2020-01-01 RX ADMIN — PROPOFOL 25 MCG/KG/MIN: 10 INJECTION, EMULSION INTRAVENOUS at 03:48

## 2020-01-01 RX ADMIN — PROPOFOL 20 MCG/KG/MIN: 10 INJECTION, EMULSION INTRAVENOUS at 03:18

## 2020-01-01 RX ADMIN — DEXTROSE MONOHYDRATE 315 MG: 50 INJECTION, SOLUTION INTRAVENOUS at 20:17

## 2020-01-01 RX ADMIN — Medication 6 PUFF: at 03:25

## 2020-01-01 RX ADMIN — SODIUM CHLORIDE: 4.5 INJECTION, SOLUTION INTRAVENOUS at 17:11

## 2020-01-01 RX ADMIN — SODIUM CHLORIDE SOLN NEBU 3% 3 ML: 3 NEBU SOLN at 19:36

## 2020-01-01 RX ADMIN — CEFEPIME HYDROCHLORIDE 2 G: 2 INJECTION, POWDER, FOR SOLUTION INTRAVENOUS at 11:37

## 2020-01-01 RX ADMIN — PROPOFOL 25 MCG/KG/MIN: 10 INJECTION, EMULSION INTRAVENOUS at 01:28

## 2020-01-01 RX ADMIN — OSELTAMIVIR PHOSPHATE 75 MG: 6 POWDER, FOR SUSPENSION ORAL at 20:29

## 2020-01-01 RX ADMIN — PANTOPRAZOLE SODIUM 40 MG: 40 INJECTION, POWDER, LYOPHILIZED, FOR SOLUTION INTRAVENOUS at 08:36

## 2020-01-01 RX ADMIN — Medication 10 ML: at 20:20

## 2020-01-01 ASSESSMENT — PULMONARY FUNCTION TESTS
PIF_VALUE: 38
PIF_VALUE: 42
PIF_VALUE: 35
PIF_VALUE: 44
PIF_VALUE: 35
PIF_VALUE: 41
PIF_VALUE: 37
PIF_VALUE: 31
PIF_VALUE: 35
PIF_VALUE: 35
PIF_VALUE: 37
PIF_VALUE: 34
PIF_VALUE: 38
PIF_VALUE: 37
PIF_VALUE: 37
PIF_VALUE: 38
PIF_VALUE: 39
PIF_VALUE: 40
PIF_VALUE: 37
PIF_VALUE: 42
PIF_VALUE: 34
PIF_VALUE: 37
PIF_VALUE: 33
PIF_VALUE: 38
PIF_VALUE: 37
PIF_VALUE: 37
PIF_VALUE: 38
PIF_VALUE: 23
PIF_VALUE: 36
PIF_VALUE: 35
PIF_VALUE: 38
PIF_VALUE: 33
PIF_VALUE: 37
PIF_VALUE: 35
PIF_VALUE: 41
PIF_VALUE: 39
PIF_VALUE: 43
PIF_VALUE: 36
PIF_VALUE: 32
PIF_VALUE: 32
PIF_VALUE: 39
PIF_VALUE: 38
PIF_VALUE: 37
PIF_VALUE: 33
PIF_VALUE: 39
PIF_VALUE: 37
PIF_VALUE: 35
PIF_VALUE: 36
PIF_VALUE: 37
PIF_VALUE: 37
PIF_VALUE: 34
PIF_VALUE: 37
PIF_VALUE: 37
PIF_VALUE: 28
PIF_VALUE: 41
PIF_VALUE: 40
PIF_VALUE: 37
PIF_VALUE: 44
PIF_VALUE: 37
PIF_VALUE: 31
PIF_VALUE: 38
PIF_VALUE: 37
PIF_VALUE: 37
PIF_VALUE: 36

## 2020-01-01 ASSESSMENT — PAIN SCALES - WONG BAKER

## 2020-01-01 ASSESSMENT — PAIN SCALES - GENERAL
PAINLEVEL_OUTOF10: 0
PAINLEVEL_OUTOF10: 3
PAINLEVEL_OUTOF10: 0
PAINLEVEL_OUTOF10: 1
PAINLEVEL_OUTOF10: 0

## 2020-02-19 PROBLEM — J96.90 RESPIRATORY FAILURE (HCC): Status: ACTIVE | Noted: 2020-01-01

## 2020-02-19 NOTE — CONSULTS
P Pulmonary and Critical Care   Consult Note      Reason for Consult: Resp Fialure, PNA, Influenza A   Requesting Physician: Dr Mariano Hernandez:   [facility] / HPI:                The patient is a 59 y.o. female with significant past medical history of:      Diagnosis Date    Breast cancer     Cellulitis     Dermatitis     Hip fracture         The patient ws transferred from an outside facility where she presented with flu like symptoms of 2 days duration. She had increasingly severe SOB and associated cough. . She has had fever as well. Exertion was a modifying factor. She is on BiPAP and feels that it does help her. She hsa no prior pulmonary history. She is a lifetime nonsmoker. Past Surgical History:        Procedure Laterality Date    HIP SURGERY      MASTECTOMY      left side     Current Medications:    Current Facility-Administered Medications: 0.9 % sodium chloride infusion, , Intravenous, Continuous  cefepime (MAXIPIME) 1 g IVPB minibag, 1 g, Intravenous, 2 times per day  vancomycin 1000 mg IVPB in 250 mL D5W addavial, 1,000 mg, Intravenous, Q12H  oseltamivir (TAMIFLU) capsule 75 mg, 75 mg, Oral, BID  pantoprazole (PROTONIX) injection 40 mg, 40 mg, Intravenous, Daily  enoxaparin (LOVENOX) injection 40 mg, 40 mg, Subcutaneous, Daily    Allergies   Allergen Reactions    Sulfa Antibiotics Shortness Of Breath and Swelling    Penicillins Hives    Percocet [Oxycodone-Acetaminophen]        Social History:    TOBACCO:   reports that she has never smoked. She does not have any smokeless tobacco history on file. ETOH:   reports no history of alcohol use. Patient currently lives independently  Environmental/chemical exposure: none known     Family History:   No family history on file. REVIEW OF SYSTEMS:    CONSTITUTIONAL:  negative for fevers, chills, diaphoresis, activity change, appetite change, fatigue, night sweats and unexpected weight change.    EYES:  negative for blurred vision, eye discharge, visual disturbance and icterus  HEENT:  negative for hearing loss, tinnitus, ear drainage, sinus pressure, nasal congestion, epistaxis and snoring  RESPIRATORY:  See HPI  CARDIOVASCULAR:  negative for chest pain, palpitations, exertional chest pressure/discomfort, edema, syncope  GASTROINTESTINAL:  negative for nausea, vomiting, diarrhea, constipation, blood in stool and abdominal pain  GENITOURINARY:  negative for frequency, dysuria, urinary incontinence, decreased urine volume, and hematuria  HEMATOLOGIC/LYMPHATIC:  negative for easy bruising, bleeding and lymphadenopathy  ALLERGIC/IMMUNOLOGIC:  negative for recurrent infections, angioedema, anaphylaxis and drug reactions  ENDOCRINE:  negative for weight changes and diabetic symptoms including polyuria, polydipsia and polyphagia  MUSCULOSKELETAL:  negative for  pain, joint swelling, decreased range of motion and muscle weakness  NEUROLOGICAL:  negative for headaches, slurred speech, unilateral weakness  PSYCHIATRIC/BEHAVIORAL: negative for hallucinations, behavioral problems, confusion and agitation. Objective:   PHYSICAL EXAM:      VITALS:  /74   Pulse 96   Temp 99 °F (37.2 °C) (Temporal)   Resp (!) 0   Ht 5' 4\" (1.626 m)   Wt 242 lb 8.1 oz (110 kg)   SpO2 (!) 86%   BMI 41.63 kg/m²      24HR INTAKE/OUTPUT:  No intake or output data in the 24 hours ending 02/19/20 1714  CONSTITUTIONAL:  awake, alert, cooperative, no apparent distress, and appears stated age  NECK:  Supple, symmetrical, trachea midline, no adenopathy, thyroid symmetric, not enlarged and no tenderness, skin normal  LUNGS:  no increased work of breathing and clear to auscultation. No accessory muscle use  CARDIOVASCULAR: S1 and S2, no edema and no JVD  ABDOMEN:  normal bowel sounds, non-distended and no masses palpated, and no tenderness to palpation. No hepatospleenomegaly  LYMPHADENOPATHY:  no axillary or supraclavicular adenopathy.  No cervical adnenopathy  PSYCHIATRIC: Oriented to person place and time. No obvious depression or anxiety. MUSCULOSKELETAL: No obvious misalignment or effusion of the joints. No clubbing, cyanosis of the digits. SKIN:  normal skin color, texture, turgor and no redness, warmth, or swelling. No palpable nodules    DATA:    Old records have been reviewed    CBC:  Recent Labs     02/19/20  1651   HGB 11.3*      BMP:  No results for input(s): NA, K, CL, CO2, BUN, CREATININE, CALCIUM, GLUCOSE in the last 72 hours. ABG:  Recent Labs     02/19/20  1651   PHART 7.290*   OQD4DEI 55.3*   PO2ART 84.3   XUG7MUC 26.6   U9VDPCEE 95   BEART 0       Lab Results   Component Value Date    BNP <15 03/11/2011     No results found for: CKTOTAL, CKMB, CKMBINDEX, TROPONINI    Cultures:     Abx:    Radiology Review:  All pertinent images / reports were reviewed as a part of this visit. Assessment:     1. Acute hypoxemic and hypercapnic respiratory failure  · 7.29/55/84  · She is on BiPAP 12/6  · I increased her BiPAP to 18/8  · Repeta ABG in one hour    2. Sepsis  · Lactate was 2  · She has had fluid resuscitation  · She does not yet need pressors  · I started her NS @ 125 cc/hr    3. Influenza A  · Start Tamiflu    4. Multifocal PNA  · CXR fro, Rin Peñaloza reported to show multifocl infiltrate  · Vanco/Maxipime  · Procal  · Culture blood, Strep/Le UAg  · CT chest    Discussed with Dr Padmaja Garcia    Total critical care time caring for this patient with life threatening illness, including direct patient contact, management of life support systems, review of data including imaging and labs, discussions with other team members and physicians is at least 32 minutes so far today, excluding procedures.

## 2020-02-19 NOTE — PROGRESS NOTES
ABG drawn x  1 attempt(s) from Right Radial. Patient had positive modified Sivakumar's Test with good collateral circulation. Patient was on 15 liters/min via high flow  at time of puncture. Pressure held for 10 minutes. No bleeding or bruising noted at puncture site.   Patient tolerated procedure well

## 2020-02-19 NOTE — H&P
Hospital Medicine History & Physical      PCP: Referring Not In System (Inactive)    Date of Admission: 2/19/2020    Date of Service: Pt seen/examined on 2/19/2020 and Admitted to Inpatient with expected LOS greater than two midnights due to medical therapy. Chief Complaint: Shortness of breath      History Of Present Illness:      59 y.o. female who has history of depression and hypertension shortness of breath presented to [facility] with admitted to another emergency room there is no bed in the ICU and patient transferred to our facility. The patient earlier today presented to [facility] emergency room with 2 days history of increasing shortness of breath and flulike symptoms. Went to primary care physician office and prescribed prednisone by mouth however never filled it. Her saturation in the emergency room was 50% on room air. Patient placed on nonrebreather at 15 L and hardly increased desaturation to 90%. Her initial temp was 100.1, pulse rate 108, blood pressure 152/94. Initial resuscitation include 2.5 L of IV normal saline, Rocephin, Zithromax, vancomycin and Solu-Medrol in the emergency room. Initial lab values are lactic acid 2.6 venous pH 7.30 carbon oxide 55.6, oxygen 43, bicarb 26.8, potassium 2.9, BUN 33, sodium 133, creatinine 108, WBC 5.3,  hemoglobin 11.1,  hematocrit 33.4. Influenza A come back positive. Patient started Tamiflu in the emergency room however there is no ICU bed available and patient transferred to our facility. Past Medical History:          Diagnosis Date    Breast cancer     Cellulitis     Dermatitis     Hip fracture        Past Surgical History:          Procedure Laterality Date    HIP SURGERY      MASTECTOMY      left side       Medications Prior to Admission:      Prior to Admission medications    Medication Sig Start Date End Date Taking? Authorizing Provider   cephALEXin (KEFLEX) 250 MG capsule Take 250 mg by mouth 4 times daily. obese, non-distended with normal bowel sounds. No hepatopslenomegaly, Garza catheter intact  Musculoskeletal:  Full range of motion without deformity. Skin: Skin color, texture, turgor normal.  No rashes or lesions. Neurologic:  Neurovascularly intact without any focal sensory/motor deficits. Cranial nerves: II-XII intact, grossly non-focal.  Psychiatric:  Alert and orientedx3, thought content appropriate, normal insight  Capillary Refill: Brisk,< 3 seconds   Extremities: Peripheral Pulses +2 palpable, equal bilaterally,  No clubbing, cyanosis or edema bilaterally    Labs:     Recent Labs     02/19/20  1651   HGB 11.3*     No results for input(s): NA, K, CL, CO2, BUN, CREATININE, CALCIUM, PHOS in the last 72 hours. Invalid input(s): MAGNES  No results for input(s): AST, ALT, BILIDIR, BILITOT, ALKPHOS in the last 72 hours. No results for input(s): INR in the last 72 hours. No results for input(s): Verneta Kassy in the last 72 hours. Urinalysis:    No results found for: Delberta Longs, BACTERIA, RBCUA, BLOODU, [address]    Radiology:     CXR: I have reviewed the CXR with the following interpretation: Reviewed previous emergency room records showing multifocal pneumonia  EKG:  I have reviewed the EKG with the following interpretation: Review from previous emergency room records showing sinus tachycardia    XR CHEST PORTABLE    (Results Pending)   CT Chest WO Contrast    (Results Pending)       ASSESSMENT:    Acute hypoxic and hypercarbic respiratory failure  Sepsis   multifocal pneumonia  Influenza A  Lactic acidosis  Hypokalemia  Acute kidney injury  Hyponatremia  Elevated transaminases    PLAN:    1. I will admit this patient to intensive care unit, continue BiPAP 18/8 to keep the saturation between 90% to 92%, will consult intensivist, I actually talked to Dr. Kaycee Magana at the bedside about the patient. I will check ABGs in the morning.   Swallowing evaluation will be ordered, cautious about eating but I put the general diet  2. Patient got resuscitation in previous emergency room, continue with IV fluids normal saline at 125 mL/h  3.  We will check procalcitonin level and continue with the empiric cefepime and vancomycin  4. Continue Tamiflu  5. We will check basic metabolic panel in the morning, patient got replacement in the previous emergency room  6. Mildly elevated creatinine noted from the records, we will monitor this closely will check basic metabolic panel in the morning  7. Most likely cause for hyponatremia is a low volume status, we will replace to volume already resuscitation done in the previous emergency room  8. We will repeat transaminases tomorrow    DVT Prophylaxis: Enoxaparin  Diet: DIET GENERAL;  Code Status: Full Code           Tariq Dominguez MD    Thank you Referring Not In System (Inactive) for the opportunity to be involved in this patient's care. If you have any questions or concerns please feel free to contact me at [phone].

## 2020-02-20 PROBLEM — J96.22 ACUTE ON CHRONIC RESPIRATORY FAILURE WITH HYPOXIA AND HYPERCAPNIA (HCC): Status: ACTIVE | Noted: 2020-01-01

## 2020-02-20 PROBLEM — J18.9 PNEUMONIA: Status: ACTIVE | Noted: 2020-01-01

## 2020-02-20 PROBLEM — J96.21 ACUTE ON CHRONIC RESPIRATORY FAILURE WITH HYPOXIA AND HYPERCAPNIA (HCC): Status: ACTIVE | Noted: 2020-01-01

## 2020-02-20 PROBLEM — J11.1 INFLUENZA: Status: ACTIVE | Noted: 2020-01-01

## 2020-02-20 PROBLEM — A41.9 SEPSIS (HCC): Status: ACTIVE | Noted: 2020-01-01

## 2020-02-20 NOTE — PROGRESS NOTES
findings not much changed, morning ABG is slightly worse than yesterday, pulmonology following  2. Currently blood pressure is stable lactic acid, sepsis resolving  3. Multifocal pneumonia most likely due to viral versus bacterial respiratory cultures pending, continue cefepime and vancomycin  4. Lactic acidosis resolved  5. Hypokalemia, hyponatremia and acute kidney injury resolved, continue IV fluids  6. AST and elevated elevation may be due to sepsis now is resolved.         Diet: DIET GENERAL;  Code:Full Code  DVT PPX lovenox       Solomon Bullard MD   2/20/2020 12:29 PM

## 2020-02-20 NOTE — PROGRESS NOTES
IR Attending    [address] [facility] performed in the [address] [facility] department to evaluate for pleural effusion    Only a tiny amount of right pleural fluid was identified, not enough for safe centesis    Thoracentesis was deferred

## 2020-02-20 NOTE — PROGRESS NOTES
Speech Language Pathology  Natalie Brown   1955     Speech Therapy/Clinical Swallow Evaluation order received. Per chart review, Pt currently requires BiPap and is pending leaving the floor for CT and potentially for thoracentesis. Therefore, Pt is unable to be seen for clinical swallow evaluation at this time. Will re-attempt at a later time as schedule allows.     Garrett Moses [facility]O#[id number]

## 2020-02-20 NOTE — FLOWSHEET NOTE
Patient alert and oriented. Lungs clear to auscultation, breathing even and unlabored, chest expansion symmetrical, no use of accessory muscles, continue to be on PAP machine, and patient denies any SOB at this time. Bowel sounds present in all quadrants, patient denies any nausea, vomiting and diarrhea at this time. Peripheral pulses present in all extremities, skin intact and no area of concern to note at this time.  BLE edema present, scattered angioectasias noted on BLE and left upper chest.  Assist to a comfortable position in bed and call light within reach.     02/19/20 2001   Vitals   Temp 98.7 °F (37.1 °C)   Temp Source Temporal   Pulse 90   Heart Rate Source Monitor   Resp (!) 33   /74   MAP (mmHg) 87   BP Location Right upper arm   BP Upper/Lower Upper   BP Method Automatic   Patient Position Semi fowlers   Level of Consciousness 0   MEWS Score 3   Patient Currently in Pain Denies   Cardiac Rhythm NSR   Oxygen Therapy   SpO2 93 %   Pulse Oximeter Device Mode Continuous   Pulse Oximeter Device Location Finger   O2 Device PAP (positive airway pressure)   Pain Assessment   Pain Assessment 0-10   Pain Level 0

## 2020-02-21 PROBLEM — R65.20 SEVERE SEPSIS (HCC): Status: ACTIVE | Noted: 2020-01-01

## 2020-02-21 PROBLEM — J10.1 INFLUENZA A: Status: ACTIVE | Noted: 2020-01-01

## 2020-02-21 NOTE — PROGRESS NOTES
Speech Language Pathology  Florecita Cost   1955     SLP dysphagia evaluation orders received 2/20/2020. Pt unable to be evaluated 2/20 due to medical status. Per chart and discussion with RN, pt was intubated this AM. Will discontinue orders at this time due to intubation and change in medical status. Discussed with RN. Please re-consult SLP as indicated.     Thanks,  Margrette Cheadle, [address] [facility]  Speech Language Pathologist

## 2020-02-21 NOTE — PROGRESS NOTES
 Sulfa Antibiotics Shortness Of Breath and Swelling    Penicillins Hives    Percocet [Oxycodone-Acetaminophen]        Social History:    TOBACCO:   reports that she has never smoked. She does not have any smokeless tobacco history on file. ETOH:   reports no history of alcohol use. Patient currently lives independently  Environmental/chemical exposure: none known     Family History:   No family history on file. REVIEW OF SYSTEMS:    CONSTITUTIONAL:  negative for fevers, chills, diaphoresis, activity change, appetite change, fatigue, night sweats and unexpected weight change. EYES:  negative for blurred vision, eye discharge, visual disturbance and icterus  HEENT:  negative for hearing loss, tinnitus, ear drainage, sinus pressure, nasal congestion, epistaxis and snoring  RESPIRATORY:  See HPI  CARDIOVASCULAR:  negative for chest pain, palpitations, exertional chest pressure/discomfort, edema, syncope  GASTROINTESTINAL:  negative for nausea, vomiting, diarrhea, constipation, blood in stool and abdominal pain  GENITOURINARY:  negative for frequency, dysuria, urinary incontinence, decreased urine volume, and hematuria  HEMATOLOGIC/LYMPHATIC:  negative for easy bruising, bleeding and lymphadenopathy  ALLERGIC/IMMUNOLOGIC:  negative for recurrent infections, angioedema, anaphylaxis and drug reactions  ENDOCRINE:  negative for weight changes and diabetic symptoms including polyuria, polydipsia and polyphagia  MUSCULOSKELETAL:  negative for  pain, joint swelling, decreased range of motion and muscle weakness  NEUROLOGICAL:  negative for headaches, slurred speech, unilateral weakness  PSYCHIATRIC/BEHAVIORAL: negative for hallucinations, behavioral problems, confusion and agitation.      Objective:   PHYSICAL EXAM:      VITALS:  /74   Pulse 89   Temp 98 °F (36.7 °C) (Temporal)   Resp (!) 33   Ht 5' 4\" (1.626 m)   Wt 245 lb 2.4 oz (111.2 kg)   SpO2 100%   BMI 42.08 kg/m²      24HR INTAKE/OUTPUT: part of this visit. Assessment:     1. Acute hypoxemic and hypercapnic respiratory failure  · 7.23/64/105  · Requires BiPAP 20/8 and FiO2 0.8. · Remains tachypneic  · I reviewed current imaging including chest x-ray from today  · This reveals worsening bilateral airspace disease. · Does not require intubation at this point but certainly at risk of progressing to mechanical ventilation. .    2. Sepsis  · Lactate has improved  · Not requiring vasopressors  · Blood pressure and pulse are acceptable    3. Influenza A  · Continue Tamiflu    4. Multifocal PNA  · Influenza A positive  · Certainly also seems to have secondary pneumonia  · MRSA probe is negative  · Remainder of her cultures are also negative  · She is afebrile and does not have a leukocytosis  · Continue cefepime  · Stop vancomycin    Addendum: Now is gone into rapid A. fib. Respond to IV Cardizem. Blood pressure remains stable. Continues to have rapid respiratory rate. She is alert. Discussed intubation with her. She understands and is willing. Neck where the point where elective intubation is beneficial.  CT imaging shows densely consolidated bilateral lower lobe airspace disease is all well is secretions layering in the trachea. We will plan bronchoscopy after intubation as well. Total critical care time caring for this patient with life threatening illness, including direct patient contact, management of life support systems, review of data including imaging and labs, discussions with other team members and physicians is at least 32 minutes so far today, excluding procedures.

## 2020-02-21 NOTE — PROGRESS NOTES
Bedside report received from off going shift to ensure safe transfer of care. Patient remains on BiPap with respiratory rate ranging from 38 to 54 per min. Patient aroused to name, denies resp distress or SOB. Will return for complete assessment.

## 2020-02-21 NOTE — PROGRESS NOTES
Message sent to ICU hospitalist as follows:     Transfer from [facility] yesterday. Acidotic since arrival. Continuous BIPAP. ABG worsening. Plan to repeat ABG approx 2030. Pt remains alert and oriented at this time but increased work of breathing. May need you to come assess soon. Will continue to monitor.  Rosario Diana RN, BSN, CCRN-[facility], 7:51 PM

## 2020-02-21 NOTE — OP NOTE
bronchus: Inflammed mucosa   Left upper lobe bronchus: Inflammed mucosa   Left lower lobe bronchus: Inflammed mucosa     Grossly inflammed airways with white plaques. Copious mucus suctioned from all bronchi and the trachea. The patient was taken to the endoscopy recovery area in satisfactory condition. Recommendation:   1. F/U on culture results   2. F/U on cytology results     Attestation: I performed the procedure.   Maru Sr

## 2020-02-21 NOTE — PROGRESS NOTES
Afternoon assessment done and recorded. Family at bedside. Tolerating current vent settings. O2 sat 97% with 60% FIO2 and PEEP of 10. See flow sheet for details.

## 2020-02-21 NOTE — OP NOTE
Central Venous Catheter Insertion Procedure Note    Procedure: Insertion of Central Venous Catheter    Indications:  vascular access    Procedure Details   Informed consent was obtained for the procedure, including sedation. Risks of lung perforation, hemorrhage, arrhythmia, and adverse drug reaction were discussed. Under sterile conditions the skin above the on the left internal jugular vein was prepped with betadine and covered with a sterile drape. Local anesthesia was applied to the skin and subcutaneous tissues. Ultrasound was used to identify the vein. An 18-gauge needle was then inserted into the vein. A guide wire was then passed easily through the catheter. There were no arrhythmias. The catheter was then withdrawn. A 7.5 Mauritian triple-lumen was then inserted into the vessel over the guide wire. The catheter was sutured into place. EBL <10 cc    Findings: There were no changes to vital signs. Catheter was flushed with 10 cc NS. Patient did tolerate procedure well. Recommendations:  CXR ordered to verify placement.       Total time of procedure   minutes

## 2020-02-21 NOTE — PROGRESS NOTES
Awakened for assessment. Arouses to name but lethargic and returns easily to sleep. Nods head yes & no to questions. Asking for ice. Remains on full face Bipap mask. Appears to be tolerating current settings 20/8 with 80% FIO2. Difficulty in obtaining accurate O2 sat. Respirations extremely tachypneic with resp rate 32-58 per min, abdominal and labored. Greatly diminished breath sounds throughout. Monitor reveals ST with heart rate 110's. Heart sounds distant. Abdomen large, soft with faint BS noted throughout. Peripheral pulses present with no edema noted. Skin cool and moist to touch. Axillary temp 99. Garza patent draining costa urine. Complete assessment done and recorded. See flow sheet for details.

## 2020-02-21 NOTE — PROGRESS NOTES
Dr Kasey Munoz here to see patient. Heart rate improved slightly after Cardizem bolus given but trending back up. Remains tachypneic and labored. Decision made to intubate. Discussed with patient who agrees with procedure.

## 2020-02-21 NOTE — PROGRESS NOTES
Intubated without difficulty with # 7.5 ETT . Suctioned for large amount thick tan/brown secretions. Bedside bronchoscopy done large copious secretions obtained. Specimen sent to lab. Central line placed left IJ. OG placed at 65 cm. Xrays obtained for line all line and device placement. See full note for details.

## 2020-02-21 NOTE — PROGRESS NOTES
At bedside with patient & monitor alarmed noting rhythm to be atrial fib with rapid vent response. Rate varying from 120-160's. BP stable but heart rate maintaining 140-160. Patient arouses to name, denies pain or SOB but continues with resp rate upper 40's. Attempting to obtain accurate pleth for O2 saturation with multiple sites but much difficulty. Acrylic nails on both hands and unable to remove them, forehead and earlobe probe not consistent. Dr Sheela Felton notified of rhythm change and persistent tachypnea. Will consider elective intubation. Orders received for cardizem bolus.

## 2020-02-21 NOTE — PROGRESS NOTES
[facility] PROGRESS NOTE    2/21/2020 8:46 AM        Name: Bret Subramanian . Admitted: 2/19/2020  Primary Care Provider: Referring Not In System (Inactive) (Tel: None)                        Hospital course:      59 y. o. female who has history of depression and hypertension shortness of breath presented to [facility] with admitted to another emergency room there is no bed in the ICU and patient transferred to our facility.  days history of increasing shortness of breath and flulike symptoms.  Went to primary care physician office and prescribed prednisone by mouth however never filled it.  Her saturation in the emergency room was 50% on room air.  Patient placed on nonrebreather at 15 L and hardly increased desaturation to 90%.  Her initial temp was 100.1, pulse rate 108, blood pressure 152/94.    Influenza A was positive and Tamiflu started, patient transferred to [facility] ICU.     Subjective:  . No acute events overnight. Resting well. Pain control. Diet ok. Labs reviewed  Denies any chest pain sob.      Reviewed interval ancillary notes    Current Medications  lidocaine PF 1 % injection 5 mL, Once  bacitracin-polymyxin b (POLYSPORIN) ointment, Once  0.9 % sodium chloride infusion, Continuous  cefepime (MAXIPIME) 2 g IVPB minibag, Q8H  oseltamivir (TAMIFLU) capsule 75 mg, BID  pantoprazole (PROTONIX) injection 40 mg, Daily  enoxaparin (LOVENOX) injection 40 mg, Daily  sodium chloride flush 0.9 % injection 10 mL, 2 times per day  sodium chloride flush 0.9 % injection 10 mL, PRN  acetaminophen (TYLENOL) tablet 650 mg, Q6H PRN  acetaminophen (TYLENOL) suppository 650 mg, Q6H PRN  polyethylene glycol (GLYCOLAX) packet 17 g, Daily PRN  promethazine (PHENERGAN) tablet 12.5 mg, Q6H PRN  ondansetron (ZOFRAN) injection 4 mg, Q6H PRN  ipratropium-albuterol (DUONEB) nebulizer solution 1 ampule, 4x daily        Objective:  /74   Pulse 89   Temp 98 °F (36.7 °C) (Temporal)   Resp (!) 33   Ht 5' 4\" (1.626 m)   Wt 245 lb 2.4 oz (111.2 kg)   SpO2 100%   BMI 42.08 kg/m²     Intake/Output Summary (Last 24 hours) at 2/21/2020 0846  Last data filed at 2/21/2020 0553  Gross per 24 hour   Intake 3519 ml   Output 1800 ml   Net 1719 ml      Wt Readings from Last 3 Encounters:   02/21/20 245 lb 2.4 oz (111.2 kg)       General appearance:  Appears comfortable  Eyes: Sclera clear. Pupils equal.  ENT: Moist oral mucosa. Trachea midline, no adenopathy. Cardiovascular: Regular rhythm, normal S1, S2. No murmur. No edema in lower extremities  Respiratory: Significant crackles bilaterally posteriors patient tachypneic, BiPAP currently 18/10 with 80% FiO2 . GI: Abdomen soft, no tenderness, not distended, normal bowel sounds  Musculoskeletal: No cyanosis in digits, neck supple  Neurology: CN 2-12 grossly intact. No speech or motor deficits  Psych: Normal affect. Alert and oriented in time, place and person  Skin: Warm, dry, normal turgor    Labs and Tests:  CBC:   Recent Labs     02/19/20  1651 02/20/20  0420 02/21/20  0449   WBC  --  6.2 6.9   HGB 11.3* 11.0* 10.5*   PLT  --  152 142     BMP:    Recent Labs     02/20/20  0420 02/21/20  0450    144   K 3.9 3.8    109   CO2 22 26   BUN 23* 21*   CREATININE 0.7 0.7   GLUCOSE 125* 111*     Hepatic:   Recent Labs     02/20/20  0420   AST 33   ALT 17   BILITOT 0.3   ALKPHOS 85         Problem List  Principal Problem:    Acute on chronic respiratory failure with hypoxia and hypercapnia (HCC)  Active Problems:    Sepsis ([address]    Influenza    Pneumonia  Resolved Problems:    * No resolved hospital problems. *       Assessment & Plan:      1. Continue BiPAP, secondary to multifocal viral pneumonia, clinical findings not much changed, patient work of breathing increase this morning, still ABG abnormal, imminent intubation  2.

## 2020-02-21 NOTE — PROGRESS NOTES
Noon assessment done and recorded. Tolerating current vent settings and accurate pleth obtained for sats. Respirations non labored with rate 20/min. FIO2 weaned to 80%. BP stable with levophed. Cardizem drip started with heart rate now controlled. See flow sheet for details.

## 2020-02-21 NOTE — PROGRESS NOTES
ABG drawn per R radial artery, awaiting results. Tolerated well.  Marcia Kurtz RN, BSN, CCRN-[facility], 10:36 PM

## 2020-02-21 NOTE — PROGRESS NOTES
Message send to ICU hospitalist as follows:     ECHO results: RV overload/hypertrophy, large bilateral effusions, massive pulm artery. IVF currently infusing at 125ml/hr. OK to decrease or stop? Awaiting response/new orders. Will continue to monitor. Rosario Diana RN, BSN, CCRN-[facility], 12:52 AM     0840: OK to decrease rate to 75ml/hr per Dr. Shirin Varner, see orders.

## 2020-02-21 NOTE — CARE COORDINATION
Discharge Planning Assessment  RN/SW discharge planner met with patient/ (and family member) to discuss reason for admission, current living situation, and potential needs at the time of discharge    Demographics/Insurance verified Yes    Current type of dwelling: House    Patient from ECF/SW confirmed with: N/A    Living arrangements: lives alone    Level of function/Support:  independent    PCP:  Dr. Jesse Cohn    Last Visit to PCP:  2/17/20    DME: None    Active with any community resources/agencies/skilled home care: No    Medication compliance issues: No    Financial issues that could impact healthcare: No    Tentative discharge plan: TBD    Transportation at the time of discharge: TBD    Currently admitted to ICU. Intubated this AM.  Remains on Diltiazem gtt,  Levophed gtt, and IV Cefepime.  + Flu. Case management will follow and assist with discharge planning as needed.     Gabino Boast RN BSN  Case Management   [phone]

## 2020-02-22 NOTE — PROGRESS NOTES
Reassessment complete, VSS, no changes. K 3.2 this AM, ok to replace per Dr Kody Nieves, PRN orders in. ABG collected, improving. Pt with no obvious needs at this time. Pt bathed overnight, linens changed, althea care provided.

## 2020-02-22 NOTE — PLAN OF CARE
Nutrition Problem: Inadequate energy intake  Intervention: Food and/or Nutrient Delivery: Start Tube Feeding  Nutritional Goals: claudia to tube feed as goal obtained with regard to hemodynamic status and propofol rate

## 2020-02-22 NOTE — PROGRESS NOTES
Shift assessment completed; see flowsheets. VSS and afebrile with pressor in place. Sedation vacation initiated. Does not appear to be in any pain, but does not follow any commands yet either.

## 2020-02-22 NOTE — PROGRESS NOTES
Reassessment complete, VSS, no changes. Pt remains sedated. Oral care and turning Q2. Pt appears comfortable in bed, tolerating ventilator.

## 2020-02-22 NOTE — PROGRESS NOTES
Assessment complete, VSS, medications administered. Pt's friend Leny at bedside, updated on plan of care, agreeable. Oral care provided, thick brown secretions lavaged from lungs. Titrating Levophed for MAP >65. Providing oral care and turns Q2. Pt appears comfortable at this time, no obvious needs.

## 2020-02-22 NOTE — PROGRESS NOTES
02/21/20 1924   Vent Patient Data   Plateau Pressure 35 [id number]   Static Compliance 16 mL/cmH2O   Dynamic Compliance 17 mL/cmH2O

## 2020-02-22 NOTE — PROGRESS NOTES
chloride flush 0.9 % injection 10 mL, 10 mL, Intravenous, 2 times per day  sodium chloride flush 0.9 % injection 10 mL, 10 mL, Intravenous, PRN  acetaminophen (TYLENOL) tablet 650 mg, 650 mg, Oral, Q6H PRN  acetaminophen (TYLENOL) suppository 650 mg, 650 mg, Rectal, Q6H PRN  polyethylene glycol (GLYCOLAX) packet 17 g, 17 g, Oral, Daily PRN  promethazine (PHENERGAN) tablet 12.5 mg, 12.5 mg, Oral, Q6H PRN  ondansetron (ZOFRAN) injection 4 mg, 4 mg, Intravenous, Q6H PRN    Allergies   Allergen Reactions    Sulfa Antibiotics Shortness Of Breath and Swelling    Penicillins Hives    Percocet [Oxycodone-Acetaminophen]        Social History:    TOBACCO:   reports that she has never smoked. She does not have any smokeless tobacco history on file. ETOH:   reports no history of alcohol use. Patient currently lives independently  Environmental/chemical exposure: none known     Family History:   No family history on file. REVIEW OF SYSTEMS:    CONSTITUTIONAL:  negative for fevers, chills, diaphoresis, activity change, appetite change, fatigue, night sweats and unexpected weight change.    EYES:  negative for blurred vision, eye discharge, visual disturbance and icterus  HEENT:  negative for hearing loss, tinnitus, ear drainage, sinus pressure, nasal congestion, epistaxis and snoring  RESPIRATORY:  See HPI  CARDIOVASCULAR:  negative for chest pain, palpitations, exertional chest pressure/discomfort, edema, syncope  GASTROINTESTINAL:  negative for nausea, vomiting, diarrhea, constipation, blood in stool and abdominal pain  GENITOURINARY:  negative for frequency, dysuria, urinary incontinence, decreased urine volume, and hematuria  HEMATOLOGIC/LYMPHATIC:  negative for easy bruising, bleeding and lymphadenopathy  ALLERGIC/IMMUNOLOGIC:  negative for recurrent infections, angioedema, anaphylaxis and drug reactions  ENDOCRINE:  negative for weight changes and diabetic symptoms including polyuria, polydipsia and polyphagia  MUSCULOSKELETAL:  negative for  pain, joint swelling, decreased range of motion and muscle weakness  NEUROLOGICAL:  negative for headaches, slurred speech, unilateral weakness  PSYCHIATRIC/BEHAVIORAL: negative for hallucinations, behavioral problems, confusion and agitation. Objective:   PHYSICAL EXAM:      VITALS:  BP (!) 87/46   Pulse 75   Temp 97 °F (36.1 °C) (Temporal)   Resp 16   Ht 5' 4\" (1.626 m)   Wt 248 lb 0.3 oz (112.5 kg)   SpO2 100%   BMI 42.57 kg/m²      24HR INTAKE/OUTPUT:      Intake/Output Summary (Last 24 hours) at 2/22/2020 0834  Last data filed at 2/22/2020 0551  Gross per 24 hour   Intake 2740.6 ml   Output 1600 ml   Net 1140.6 ml     CONSTITUTIONAL:  awake, alert, cooperative, no apparent distress, and appears stated age  NECK:  Supple, symmetrical, trachea midline, no adenopathy, thyroid symmetric, not enlarged and no tenderness, skin normal  LUNGS:  no increased work of breathing and clear to auscultation. No accessory muscle use  CARDIOVASCULAR: S1 and S2, no edema and no JVD  ABDOMEN:  normal bowel sounds, non-distended and no masses palpated, and no tenderness to palpation. No hepatospleenomegaly  LYMPHADENOPATHY:  no axillary or supraclavicular adenopathy. No cervical adnenopathy  PSYCHIATRIC: Oriented to person place and time. No obvious depression or anxiety. MUSCULOSKELETAL: No obvious misalignment or effusion of the joints. No clubbing, cyanosis of the digits. SKIN:  normal skin color, texture, turgor and no redness, warmth, or swelling.  No palpable nodules    DATA:    Old records have been reviewed    CBC:  Recent Labs     02/20/20  0420 02/21/20  0449 02/21/20  1042 02/22/20  0420   WBC 6.2 6.9  --  11.0   RBC 3.56* 3.42*  --  3.29*   HGB 11.0* 10.5* 10.3* 10.0*   HCT 33.8* 32.1*  --  31.0*    142  --  147   MCV 95.0 93.9  --  94.4   MCH 31.0 30.6  --  30.5   MCHC 32.6 32.6  --  32.3   RDW 14.7 15.0  --  15.5*      BMP:  Recent Labs     02/20/20  0420 procedures.

## 2020-02-22 NOTE — PROGRESS NOTES
Nutrition Assessment    Type and Reason for Visit: Initial    Nutrition Recommendations:   1. Recommend order \"Diet: Tube feed continuous/ NPO\". Initiate Vital High Protein (low calorie, high protein formula) at 15 mL/hr and as tolerated to goal of 30 mL/hr. Rate is limited d/t propofol  2. Recommend 30 mL H20 q 4 hours. Currently receiving IV fluids of NS @ 75 mL/hr. Increase flush if Na increases greater than 145 mEq/L.  3. Recommend  2 Bottles Proteinex P2Go daily via syringe. Flush with 30 mL H20 before and after. Proteinex P2Go should not be mixed directly with the tube feeding formula. 4. Ensure head of bed is 30 - 45 degrees during continuous gastric feeding and for one hour after bolus. Turn off the feeding if head of bed is lowered less than 30 degrees. 5. Monitor for tolerance (bowel habits, N/V, cramping). 6. Irrigate tube with 30 mL water before, between and after each medication. Nutrition Assessment: Nutrition status prior to admission unknown at this point. Pt intubated and sedated, no family present to provide diet and wt hx. Pt receiving NS @ 75 mL/hr, sedated on propofol and receiving levo. OK to start enteral nutrition.          Malnutrition Assessment:  · Malnutrition Status: No malnutrition    Nutrition Risk Level: High    Nutrient Needs:  · Estimated Daily Total Kcal: 1238 -1575  · Estimated Daily Protein (g): 108 - 135  · Estimated Daily Total Fluid (ml/day): 1620    Nutrition Diagnosis:   · Problem: Inadequate energy intake  · Etiology: related to Impaired respiratory function-inability to consume food     Signs and symptoms:  as evidenced by NPO status due to medical condition, Intubation    Objective Information:  · Nutrition-Focused Physical Findings: +influenza; 2/22: K+ 3.2, Na 145  · Wound Type: None  · Current Nutrition Therapies:  · Oral Diet Orders: NPO   · Anthropometric Measures:  · Ht: 5' 4\" (162.6 cm)   · Current Body Wt: 248 lb (112.5 kg)  · Ideal Body Wt: 120 lb (54.4 kg)   · BMI Classification: BMI > or equal to 40.0 Obese Class III    Nutrition Interventions:   Start Tube Feeding  Continued Inpatient Monitoring, Education Not Indicated    Nutrition Evaluation:   · Evaluation: Goals set   · Goals: claudia to tube feed as goal obtained with regard to hemodynamic status and propofol rate    · Monitoring: TF Intake, TF Tolerance, Weight, Pertinent Labs, Monitor Hemodynamic Status      Electronically signed by Christine Ray RD, LD on 2/22/20 at 9:52 AM  Weekend Contact Number: [phone]

## 2020-02-23 NOTE — PROGRESS NOTES
Afternoon assessment done and recorded. No significant changes to report. O2 sat 97% . Tolerating these current vent settings.

## 2020-02-23 NOTE — PROGRESS NOTES
Patients O2 sat dropping significantly to 77%. FIO2 increased to 100%. Peak pressure elevated and suctioned for large amount thick tan/brown secretions. Sat improved to 91% but continues to trend back down to mid 80's despite FIO2 at 100%. Suctioned and lavaged for thick secretions. Dr Eduardo Davis here for rounds. Updated. Examined patient and reviewed chart. Will set up for bedside bronch as unable to maintain adequate saturations. See complete MD note for details.

## 2020-02-23 NOTE — PROGRESS NOTES
Once  bacitracin-polymyxin b (POLYSPORIN) ointment, Once  0.9 % sodium chloride infusion, Continuous  cefepime (MAXIPIME) 2 g IVPB minibag, Q8H  pantoprazole (PROTONIX) injection 40 mg, Daily  enoxaparin (LOVENOX) injection 40 mg, Daily  sodium chloride flush 0.9 % injection 10 mL, 2 times per day  sodium chloride flush 0.9 % injection 10 mL, PRN  acetaminophen (TYLENOL) tablet 650 mg, Q6H PRN  acetaminophen (TYLENOL) suppository 650 mg, Q6H PRN  polyethylene glycol (GLYCOLAX) packet 17 g, Daily PRN  promethazine (PHENERGAN) tablet 12.5 mg, Q6H PRN  ondansetron (ZOFRAN) injection 4 mg, Q6H PRN        Objective:  /68   Pulse 108   Temp 98.1 °F (36.7 °C) (Temporal)   Resp 18   Ht 5' 4\" (1.626 m)   Wt 248 lb 3.8 oz (112.6 kg)   SpO2 96%   BMI 42.61 kg/m²     Intake/Output Summary (Last 24 hours) at 2/23/2020 1319  Last data filed at 2/23/2020 0540  Gross per 24 hour   Intake 2637.1 ml   Output 1525 ml   Net 1112.1 ml      Wt Readings from Last 3 Encounters:   02/23/20 248 lb 3.8 oz (112.6 kg)       General appearance:  lady, intubated and sedated, appears comfortable  Eyes: Sclera clear. Pupils equal.  ENT: Moist oral mucosa. Trachea midline, no adenopathy. Cardiovascular: Regular rhythm, normal S1, S2. No murmur. No edema in lower extremities  Respiratory: Not using accessory muscles. Good inspiratory effort. Clear to auscultation bilaterally, no wheeze or crackles. GI: Abdomen soft, no tenderness, not distended, normal bowel sounds  Musculoskeletal: No cyanosis in digits, neck supple  Neurology: CN 2-12 grossly intact. No speech or motor deficits  Psych: Normal affect.   Intubated and sedated   Skin: Warm, dry, normal turgor      Labs and Tests:  CBC:   Recent Labs     02/21/20  0449 02/21/20  1042 02/22/20  0420 02/23/20  0410   WBC 6.9  --  11.0 12.6*   HGB 10.5* 10.3* 10.0* 9.3*     --  147 144     BMP:    Recent Labs     02/21/20  0450 02/22/20  0420 02/22/20  1715 02/23/20  0410

## 2020-02-23 NOTE — PROGRESS NOTES
02/22/20 1952   Vent Patient Data   Plateau Pressure 34 [id number]   Static Compliance 24 mL/cmH2O   Dynamic Compliance 18 mL/cmH2O

## 2020-02-23 NOTE — OP NOTE
Bronchoscopy Procedure Note    Date of Operation: 2/23/20  Pre-op Diagnosis: Mucous plugging  Post-op Diagnosis: Mucous plugging  Surgeon: Michaelle Valdez  Anesthesia: Patient intubated, sedated on mechanical ventilation. Additional sedation required  Estimate Blood Loss: Minimal   Complications: None    Indications and History:  The patient is 59 y.o. female with pneumonia and respiratory ear. The risks, benefits, complications, treatment options and expected outcomes were discussed with the patient. The possibilities of reaction to medication, pulmonary aspiration, perforation of a viscus, bleeding, failure to diagnose a condition and creating a complication requiring transfusion or operation were discussed with the patient's family who freely signed the consent. Description of Procedure: The patient was taken to endoscopy suite, identified as Alleen Lights and the procedure verified as flexible fiberoptic bronchoscopy. A time out was held and the above information confirmed. After the induction of topical nasopharyngeal anesthesia, the patient was placed in appropriate position and the bronchoscope was passed through the ETT. The scope was then passed into the trachea. Lidocaine 2% 3 ml was used topically on the ellen. Careful inspection of the tracheal lumen was accomplished. The scope was sequentially passed into the left main and then left upper and lower bronchi and segmental bronchi. The scope was then withdrawn and advanced into the right main bronchus and then into the RUL, RML, and RLL bronchi and segmental bronchi.      Endobronchial findings:   Trachea: Normal mucosa   Ellen: Normal mucosa   Right main bronchus: Normal mucosa   Right upper lobe bronchus: Normal mucosa   Right middle lobe bronchus: Normal mucosa   Right lower lobe bronchus: Normal mucosa   Left main bronchus: Mucous plugging  Left upper lobe bronchus: Normal mucosa   Left lower lobe bronchus: Mucous plugging    Airways are edematous and erythematous, friable. Copious mucus plugging was suctioned primarily from the left main bronchus and left lower lobe bronchus. The patient was taken to the endoscopy recovery area in satisfactory condition. Recommendation:   1. F/U on culture results   2. F/U on cytology results     Attestation: I performed the procedure.   Chris Strickland

## 2020-02-23 NOTE — PROGRESS NOTES
Reassessment complete, VSS, no changes. Pt bathed, complete linen change, althea care and rivera care provided. Sedation vacation performed with AM labs, pt with eyes open after approximately 4 minutes but not following commands, moving arms but with no obvious purpose, coughing / gagging, not tolerating vent, sedation restarted and now appears comfortable. TF residual 15ml, tolerating TF. No obvious needs at this time.

## 2020-02-24 NOTE — PROGRESS NOTES
02/24/20 0740   Vent Patient Data   Plateau Pressure 35 [id number]   Static Compliance 23.48 mL/cmH2O   Dynamic Compliance 20.96 mL/cmH2O

## 2020-02-24 NOTE — PROGRESS NOTES
Provides: Vital High Protein at 30 mL per hour to provide 720 mL total volume, 720 calories, 63 grams protein, 602 mL free water  · Goal TF & Flush Orders Provides: Vital High Protein at 50 mL per hour to provide 1200 mL total volume, 1200 calories, 105 grams protein, 1003 mL free water  · Additional Calories: Propofol at 10 mL per hour to provide 264 calories from fat daily  · Anthropometric Measures:  · Ht: 5' 4\" (162.6 cm)   · Current Body Wt: 252 lb (114.3 kg)  · Admission Body Wt: 242 lb 8.1 oz (110 kg)  · Ideal Body Wt: 120 lb (54.4 kg),   · BMI Classification: BMI > or equal to 40.0 Obese Class III    Nutrition Interventions:   Modify current Tube Feeding  Continued Inpatient Monitoring, Education Not Indicated    Nutrition Evaluation:   · Evaluation: Goals set   · Goals: Pt will tolerate EN at goal    · Monitoring: TF Intake, TF Tolerance, Weight, Pertinent Labs      Electronically signed by Francisca Miramontes RD, LD on 2/24/20 at 8:52 AM    Contact Number: [phone]

## 2020-02-24 NOTE — PROGRESS NOTES
Assessment complete, VSS, medications administered. Pt's SpO2 remains %, will attempt to wean FiO2 as tolerated overnight. Tolerating TF, protein supplement bolus given this evening. Pt appears comfortable at this time, no signs of pain.   Turning and providing oral care Q2.

## 2020-02-24 NOTE — PROGRESS NOTES
Reassessment complete, VSS, no changes. Turning pt Q2, providing oral care Q2, brown/dark red sputum suctioned from ETT. Pt appears comfortable at this time, no obvious needs.

## 2020-02-24 NOTE — PROGRESS NOTES
Pt with tachypnea and increased HR. Appears to be in pain at this time. Continues not to follow commands and weakly responds to painful stimuli. Continues not to respond to peripheral stimuli. Updated sister via phone. Sedation resumed.

## 2020-02-24 NOTE — PROGRESS NOTES
02/23/20 [address] [place] [address] Patient Data   Plateau Pressure 35 [id number]   Static Compliance 25 mL/cmH2O   Dynamic Compliance 20.92 mL/cmH2O

## 2020-02-24 NOTE — PROGRESS NOTES
Pulmonary & Critical Care Medicine ICU Progress Note    Admit Date: 2020  PCP: Referring Not In System (Inactive)    CC:  acute hypoxemic respiratory failure, influenza, aspergillosis, staph pneumonia  Events of Last 24 hours:  Remains intubated and sedated. Of pressors. Had worsened hypoxia yesterday requiring frequent suctioning and a repeat bronchoscopy. Vitals:  Tmax:  VITALS:  BP 95/62   Pulse 108   Temp 97.8 °F (36.6 °C) (Temporal)   Resp 20   Ht 5' 4\" (1.626 m)   Wt 252 lb 10.4 oz (114.6 kg)   SpO2 96%   BMI 43.37 kg/m²   24HR INTAKE/OUTPUT:      Intake/Output Summary (Last 24 hours) at 2020 0820  Last data filed at 2020 0600  Gross per 24 hour   Intake 3138.1 ml   Output 1225 ml   Net 1913.1 ml     CURRENT PULSE OXIMETRY:  SpO2: 96 %  24HR PULSE OXIMETRY RANGE:  SpO2  Av.4 %  Min: 93 %  Max: 100 %      Vent:  D# 4    Vent Settings:  Vent Mode: AC/PC Rate Set: 16 bmp/Vt Ordered: 400 mL/ /FiO2 : 50 %  PEEP 10  Recent Labs     20  0342 20  0524   PHART 7.388 7.376   MAA3WEV 43.7 44.5   PO2ART 86.9 109.0*         EXAM:  General: No distress. Sedated. Eyes: PERRL. No sclera icterus. No conjunctival injection. ENT: ETT in place  Neck: Trachea midline. Normal thyroid. Resp: No accessory muscle use. No crackles. No wheezing. No rhonchi. CV: Regular rate. Regular rhythm. No mumur or rub. No edema. GI: Non-tender. Non-distended. No masses. No organmegaly. Normal bowel sounds. Skin: Warm and dry. No nodule on exposed extremities. No rash on exposed extremities. Lymph: No cervical LAD. No supraclavicular LAD. M/S: No cyanosis. No joint deformity. No clubbing. Neuro:Intubated, sedated. Will not follow commands. Positive pupils/gag/corneals. Normal pain response. Psych: Intubated, sedated.        IV:   propofol 15 mcg/kg/min (20 0619)    fentaNYL (SUBLIMAZE) infusion 0.5 mcg/kg/hr (20 0811)    sodium chloride 75 mL/hr at 20 5536

## 2020-02-25 NOTE — PROGRESS NOTES
02/25/20 0321   Vent Patient Data   Plateau Pressure 35 [id number]   Static Compliance 25 mL/cmH2O   Dynamic Compliance 22.55 mL/cmH2O

## 2020-02-25 NOTE — PROGRESS NOTES
Perfect serve to pulmonology concerning repeat ABG. Rounding at bedside. New fluids started for Na+ level.  Vent settings adjusted by intensivist

## 2020-02-25 NOTE — FLOWSHEET NOTE
Sedated on vent with Propofol and Fentanyl, tolerating vent. Monitor Afib with controlled VR. Opens eyes to verbal stimuli, does not follow commands. Pupils equal and reactive. Positive cough and gag reflexes. Tolerating tube feed @ goal rate. Garza with adequate urine. See flow sheets for assessment.

## 2020-02-25 NOTE — PROGRESS NOTES
02/25/20 0832   Vent Patient Data   Plateau Pressure 26 [id number]   Static Compliance 19 mL/cmH2O   Dynamic Compliance 22 mL/cmH2O

## 2020-02-25 NOTE — PROGRESS NOTES
Changed pt from [facility] to [facility] per MD thornton. Pt peak pressures 40 and vte 600's with PC. MD Estephanie Palm wanted to use ARDS protocol low tidal volumes with a higher rate.  380/ 20/50/+10

## 2020-02-25 NOTE — PROGRESS NOTES
Pulmonary & Critical Care Medicine ICU Progress Note    Admit Date: 2020  PCP: Referring Not In System (Inactive)    CC:  acute hypoxemic respiratory failure, influenza, aspergillosis, staph pneumonia  Events of Last 24 hours:  Remains intubated and sedated. Off pressors but had to go on briefly yesterday after she had to get a higher dose of sedation. Vitals:  Tmax:  VITALS:  /74   Pulse 108   Temp 100.1 °F (37.8 °C) (Axillary)   Resp 22   Ht 5' 4\" (1.626 m)   Wt 253 lb 4.9 oz (114.9 kg)   SpO2 100%   BMI 43.48 kg/m²   24HR INTAKE/OUTPUT:      Intake/Output Summary (Last 24 hours) at 2020  Last data filed at 2020 0548  Gross per 24 hour   Intake 3162.34 ml   Output 2375 ml   Net 787.34 ml     CURRENT PULSE OXIMETRY:  SpO2: 100 %  24HR PULSE OXIMETRY RANGE:  SpO2  Av.3 %  Min: 88 %  Max: 100 %      Vent:  D# 5    Vent Settings:  Vent Mode: AC/PC Rate Set: 16 bmp/Vt Ordered: 400 mL/ /FiO2 : 50 %  PEEP 10  Recent Labs     20  0524 20  0548   PHART 7.376 7.443   [id number] 44.5 40.9   PO2ART 109.0* 109.0*         EXAM:  General: No distress. Sedated. Eyes: PERRL. No sclera icterus. No conjunctival injection. ENT: ETT in place  Neck: Trachea midline. Normal thyroid. Resp: No accessory muscle use. No crackles. No wheezing. No rhonchi. CV: Regular rate. Regular rhythm. No mumur or rub. No edema. GI: Non-tender. Non-distended. No masses. No organmegaly. Normal bowel sounds. Skin: Warm and dry. No nodule on exposed extremities. No rash on exposed extremities. Lymph: No cervical LAD. No supraclavicular LAD. M/S: No cyanosis. No joint deformity. No clubbing. Neuro:Intubated, sedated. Will not follow commands. Positive pupils/gag/corneals. Normal pain response. Psych: Intubated, sedated.        IV:   norepinephrine Stopped (20 7695)    propofol 5 mcg/kg/min (20 0050)    fentaNYL (SUBLIMAZE) infusion 0.5 mcg/kg/hr (20 1400) Scheduled Meds:     sodium chloride (Inhalant)  15 mL Nebulization TID    voriconazole  4 mg/kg (Adjusted) Intravenous Q12H    insulin lispro  0-6 Units Subcutaneous Q4H    levofloxacin  500 mg Intravenous Q24H    chlorhexidine  15 mL Mouth/Throat BID    albuterol sulfate HFA  6 puff Inhalation Q4H    ipratropium  6 puff Inhalation Q4H    lidocaine PF  5 mL Intradermal Once    bacitracin-polymyxin b   Topical Once    pantoprazole  40 mg Intravenous Daily    enoxaparin  40 mg Subcutaneous Daily    sodium chloride flush  10 mL Intravenous 2 times per day         Diet: DIET TUBE FEED CONTINUOUS/CYCLIC NPO; Low Calorie High Protein; Orogastric; Continuous; 50; 24     Results:  CBC:   Recent Labs     02/23/20  0410 02/24/20  0355 02/25/20  0545   WBC 12.6* 17.6* 18.1*   HGB 9.3* 8.9* 8.4*   HCT 28.4* 28.0* 25.6*   MCV 93.0 93.2 92.1    171 195     BMP:   Recent Labs     02/23/20  0410 02/23/20  1640 02/24/20  0355 02/24/20  1830 02/25/20  0545   *  --  149*  --  150*   K 3.1* 3.5 3.9  --  3.2*   *  --  113*  --  113*   CO2 26  --  26  --  27   PHOS  --   --  0.7* 2.0* 2.7   BUN 28*  --  35*  --  40*   CREATININE 0.6  --  0.6  --  0.6     LIVER PROFILE: No results for input(s): AST, ALT, LIPASE, BILIDIR, BILITOT, ALKPHOS in the last 72 hours. Invalid input(s): AMYLASE,  ALB  PT/INR: No results for input(s): PROTIME, INR in the last 72 hours. APTT: No results for input(s): APTT in the last 72 hours. UA:No results for input(s): NITRITE, COLORU, PHUR, LABCAST, WBCUA, RBCUA, MUCUS, TRICHOMONAS, YEAST, BACTERIA, CLARITYU, SPECGRAV, LEUKOCYTESUR, UROBILINOGEN, BILIRUBINUR, BLOODU, GLUCOSEU, AMORPHOUS in the last 72 hours. Invalid input(s): Nonda Sarna    Cultures:  Blood:  Urine:  Sputum:    Films:  CXR reviewed by me and it showed   CT HEAD WO CONTRAST   Final Result   No acute intracranial abnormality. Mild inferior right mastoid air cell opacification including fluid. XR CHEST PORTABLE   Final Result   Improved aeration of the lungs         XR CHEST PORTABLE   Final Result   Persistent bilateral airspace disease, increased in the apices         XR CHEST PORTABLE   Final Result   1. Endotracheal tube 3.6 cm above the ellen. XR CHEST PORTABLE   Final Result   Gross cardiomegaly with increased and moderate congestive failure. Superimposed pneumonia or aspiration also considered. CT Chest WO Contrast   Final Result   1. Dense multifocal airspace consolidation throughout both lungs, primarily   affecting the right middle lobe and bilateral lower lobes, most consistent   with multifocal pneumonia. 2. Additional consolidative, ground-glass, and nodular opacities within both   upper lobes likely reflect the continuum of an infectious or inflammatory   process. 3. Small bilateral pleural effusions. 4. Soft tissue attenuation within the bibasilar bronchi, likely mucous   plugging. 5. Mild mediastinal and bilateral hilar lymphadenopathy is likely benign and   reactive in etiology given the patient's underlying lung findings. 6. Multiple nonspecific nodular foci within the lumen of the trachea, which   could represent retained mucus secretions. However, polypoid tracheal   lesions are also a diagnostic consideration. Consider correlation with   bronchoscopy. 7. Findings consistent with chronic pulmonary arterial hypertension. 8. Cardiomegaly. US CHEST INCLUDING MEDIASTINUM   Final Result   1. Tiny amount of right-sided pleural fluid identified, not enough for safe   centesis. 2. Thoracentesis was deferred. XR CHEST PORTABLE   Final Result   Moderate right-sided pleural effusion and right basilar airspace   consolidation, atelectasis versus pneumonia. Follow-up to resolution   recommended to exclude underlying lesion. Patchy bilateral airspace disease which may be related to multifocal   pneumonia and/or edema.

## 2020-02-25 NOTE — PROGRESS NOTES
Shift assessment complete; see flowsheets. HR elevated, monitor reads afib. Message sent to hospitalist concerning rate- 1 time dose of diltiazem 20mg IVP ordered, and 25mg PO lospressor to be given scheduled. Does not appear to be in pain, sedation increased to promote rest as pt is also hyperventilating. Does not respond to pain in periphery, does have +gag and grimace with suctioning. Family updated via phone. Denies further questions.

## 2020-02-25 NOTE — PROGRESS NOTES
Repeat ABG performed following ventilator changes from AC/PC this AM, to AC/VC- see flowsheets for details. Pt has become more acidotic. Pulmonology requests to change tidal volume to 400. Changes made to ventilator. Will reassess.

## 2020-02-25 NOTE — PROGRESS NOTES
[facility] PROGRESS NOTE    2/24/2020 7:56 PM        Name: Rom Laurent . Admitted: 2/19/2020  Primary Care Provider: Referring Not In System (Inactive) (Tel: None)                        Hospital course:      59 y. o. female who has history of depression and hypertension shortness of breath presented to [facility] with admitted to another emergency room there is no bed in the ICU and patient transferred to our facility.  days history of increasing shortness of breath and flulike symptoms.  Went to primary care physician office and prescribed prednisone by mouth however never filled it.  Her saturation in the emergency room was 50% on room air.  Patient placed on nonrebreather at 15 L and hardly increased desaturation to 90%.  Her initial temp was 100.1, pulse rate 108, blood pressure 152/94.    Influenza A was positive and Tamiflu started, patient transferred to [facility] ICU. Patient intubated on 2/21/2020      Subjective: Intubated and sedated  No acute events overnight. Resting well. Pain control. Diet ok. Labs reviewed  Denies any chest pain sob.      Reviewed interval ancillary notes    Current Medications  sodium phosphate 18.33 mmol in dextrose 5 % 250 mL IVPB, PRN    Or  sodium phosphate 36.66 mmol in dextrose 5 % 250 mL IVPB, PRN  sodium chloride (Inhalant) 3 % nebulizer solution 15 mL, TID  voriconazole (VFEND) 470 mg in dextrose 5 % 100 mL IVPB, Q12H    Followed by  Alexa Kayser ON 2/25/2020] voriconazole (VFEND) 315 mg in dextrose 5 % 100 mL IVPB, Q12H  insulin lispro (1 Unit Dial) 0-6 Units, Q4H  potassium chloride 20 mEq/50 mL IVPB (Central Line), PRN  levofloxacin (LEVAQUIN) 500 MG/100ML infusion 500 mg, Q24H  propofol injection, Titrated  fentaNYL (SUBLIMAZE) 1,000 mcg in sodium chloride 0.9 % 100 mL infusion, Continuous  chlorhexidine HGB 10.0* 9.3* 8.9*    144 171     BMP:    Recent Labs     02/22/20  0420  02/23/20  0410 02/23/20  1640 02/24/20  0355     --  146*  --  149*   K 3.2*   < > 3.1* 3.5 3.9     --  111*  --  113*   CO2 26  --  26  --  26   BUN 25*  --  28*  --  35*   CREATININE 0.7  --  0.6  --  0.6   GLUCOSE 143*  --  155*  --  121*    < > = values in this interval not displayed. Problem List  Principal Problem:    Acute on chronic respiratory failure with hypoxia and hypercapnia (HCC)  Active Problems:    Severe sepsis (HCC)    Influenza A    Multifocal pneumonia    Acute hypoxemic respiratory failure (HCC)  Resolved Problems:    * No resolved hospital problems. *            Assessment & Plan:      1. Acute respiratory failure due to multifocal  viral pneumonia, intubated on 2/21/20, status post bronchoscopy on 2/21 and  this AM, mucous plugging washed out, CT scan of chest showed multifocal dense airspace consolidation. 2.  Currently blood pressure is stable lactic acid, sepsis resolving  3.  Multifocal pneumonia most likely due to viral versus bacterial,  respiratory cultures revealed MSSA, continue with cefepime and Levaquin continue cefepime. Also, will continue  oseltamivir for influenza A  4. Atrial fibrillation, RVR, diltiazem drip  5.  Hypokalemia: Potassium replacement protocol  6.  AST and elevated elevation may be due to sepsis now is resolved.       Diet: DIET TUBE FEED CONTINUOUS/CYCLIC NPO; Low Calorie High Protein; Orogastric; Continuous; 50; 24  Code:Full Code  DVT PPX lovenox       Jing Newton MD   2/24/2020 7:56 PM

## 2020-02-26 NOTE — PROGRESS NOTES
Mild inferior right mastoid air cell opacification including fluid. XR CHEST PORTABLE   Final Result   Improved aeration of the lungs         XR CHEST PORTABLE   Final Result   Persistent bilateral airspace disease, increased in the apices         XR CHEST PORTABLE   Final Result   1. Endotracheal tube 3.6 cm above the ellen. XR CHEST PORTABLE   Final Result   Gross cardiomegaly with increased and moderate congestive failure. Superimposed pneumonia or aspiration also considered. CT Chest WO Contrast   Final Result   1. Dense multifocal airspace consolidation throughout both lungs, primarily   affecting the right middle lobe and bilateral lower lobes, most consistent   with multifocal pneumonia. 2. Additional consolidative, ground-glass, and nodular opacities within both   upper lobes likely reflect the continuum of an infectious or inflammatory   process. 3. Small bilateral pleural effusions. 4. Soft tissue attenuation within the bibasilar bronchi, likely mucous   plugging. 5. Mild mediastinal and bilateral hilar lymphadenopathy is likely benign and   reactive in etiology given the patient's underlying lung findings. 6. Multiple nonspecific nodular foci within the lumen of the trachea, which   could represent retained mucus secretions. However, polypoid tracheal   lesions are also a diagnostic consideration. Consider correlation with   bronchoscopy. 7. Findings consistent with chronic pulmonary arterial hypertension. 8. Cardiomegaly. US CHEST INCLUDING MEDIASTINUM   Final Result   1. Tiny amount of right-sided pleural fluid identified, not enough for safe   centesis. 2. Thoracentesis was deferred. XR CHEST PORTABLE   Final Result   Moderate right-sided pleural effusion and right basilar airspace   consolidation, atelectasis versus pneumonia. Follow-up to resolution   recommended to exclude underlying lesion.       Patchy bilateral airspace disease which may be related to multifocal   pneumonia and/or edema. Assessment/Plan:  The patient is a 59 y.o. female with acute hypoxemic respiratory failure 2/2 influenza, mssa and aspergillus pneumonia    Neuro/Sedation:  Sedated deeply with fentanyl and propofol, but looks uncomfortable    CV:  Converted to sinus overnight. Previously inatrial fibrillation, off dilt drip    Respiratory failure:  Secretions improved on heated wire circuit, but persist. Continue hypertyonic. Changed to volume control settings yesterday. AC/ ml rr 24, PEEP 10. Patient qualifies as ARDS. Turned fio2 down 40% and will wean PEEP to 8 this afternoon if she tolerates. ID:  Recheck procal.  If it has come down to intermediate range then we can discontinue the levaquin. Continue VFend for the aspergillus. Completed tamiflu. Low grade fever yesterday. Renal:  No issues. GI: tolerating TFs, but needs to have a BM. :  Good uop    FEN:  Tolerating TFs and moving bowels. Increasing free water because of hypernatremia. Continue half normal for another 24 hours. Prophylaxis:  Head of bed maintained at >30 degrees at all times except for procedures and repositioning. Pantoprazole, SQ enoxaparin and SCD's       Critical care time spent reviewing labs/films, examining patient, collaborating with other physicians but excluding procedures for life threatening organ failure is 36 minutes.       Sae Humphries MD

## 2020-02-26 NOTE — PROGRESS NOTES
Shift assessment complete; see flowsheets. VSS and afebrile. Sedation vacation completed, pt grimacing without stimulation, seems to be more awake today, but continues to be unable to follow commands. Does not track with eyes, but +PERRL. Does not respond to pain peripherally. ETT 21@ lip, #7, +gag. [email or web address] [email or web address], residual minimal. Sedation resumed, will make comfortable. Bathed using chlorihexidine wipes and bath wipes prior.

## 2020-02-26 NOTE — FLOWSHEET NOTE
Sedated on vent with low dose Propofol and Fentanyl. Pupils equal and reactive. Positive cough and gag reflexes. Does not follow commands. Tolerating vent. Lungs diminished with basilar crackles. Monitor Afib with controlled VR. Tolerating tube feed @ goal rate. Garza with adequate urine. BP stable off Levophed.

## 2020-02-26 NOTE — FLOWSHEET NOTE
Sedation off for 30 minutes, eyes open, facial grimacing, tachypneic, does not follow commands. Sedation resumed.

## 2020-02-27 NOTE — PROGRESS NOTES
9. 0* 7.8* 7.7*    195  --   --  211  --     < > = values in this interval not displayed. BMP:    Recent Labs     02/24/20  0355 02/25/20  0545 02/26/20  0545 02/26/20  1705   * 150* 151*  --    K 3.9 3.2* 3.7 3.6   * 113* 112*  --    CO2 26 27 30  --    BUN 35* 40* 43*  --    CREATININE 0.6 0.6 0.6  --    GLUCOSE 121* 127* 120*  --          Problem List  Principal Problem:    Acute on chronic respiratory failure with hypoxia and hypercapnia (HCC)  Active Problems:    Severe sepsis (HCC)    Influenza A    Multifocal pneumonia    Acute hypoxemic respiratory failure (HCC)  Resolved Problems:    * No resolved hospital problems. *            Assessment & Plan:      1. Acute respiratory failure due to multifocal  viral pneumonia, intubated on 2/21/20, status post bronchoscopy on 2/21 and  this AM, mucous plugging washed out, CT scan of chest showed multifocal dense airspace consolidation. Influenza aspergillus and staph. 2.  Currently blood pressure is stable lactic acid, sepsis resolving  3.  Multifocal pneumonia most likely due to viral versus bacterial,  respiratory cultures revealed MSSA, continue with cefepime and Levaquin continue cefepime. Also, will continue  oseltamivir for influenza A    4. ARDS  - Management per pulm. Vent setting adjusted. 4.  Atrial fibrillation, RVR, diltiazem drip  5.  Hypokalemia: Potassium replacement protocol  6.  AST and elevated elevation may be due to sepsis now is resolved. Diet: DIET TUBE FEED CONTINUOUS/CYCLIC NPO; Low Calorie High Protein; Orogastric; Continuous; 50; 24  Code:Full Code  DVT PPX lovenox     30- min critical care time spent.      Damon Russell MD   2/26/2020 11:31 PM

## 2020-02-27 NOTE — PROGRESS NOTES
Pt transported from ICU to CT via 100% ambu bag and was placed on the I-vent during the CT. Pt was then transported back from CT to ICU via 100% ambu bag. Pt did desat once we got to CT but once the pt was bagged the spo2 remained 92-97% during the transport. Pt returned to room and placed on the 980 ventilator with original settings. Pt tolerating well, SpO2 92%, will continue to monitor.

## 2020-02-27 NOTE — PROGRESS NOTES
--   --  211  --     < > = values in this interval not displayed. BMP:    Recent Labs     02/24/20  0355 02/25/20  0545 02/26/20  0545 02/26/20  1705   * 150* 151*  --    K 3.9 3.2* 3.7 3.6   * 113* 112*  --    CO2 26 27 30  --    BUN 35* 40* 43*  --    CREATININE 0.6 0.6 0.6  --    GLUCOSE 121* 127* 120*  --          Problem List  Principal Problem:    Acute on chronic respiratory failure with hypoxia and hypercapnia (HCC)  Active Problems:    Severe sepsis (HCC)    Influenza A    Multifocal pneumonia    Acute hypoxemic respiratory failure (HCC)  Resolved Problems:    * No resolved hospital problems. *            Assessment & Plan:      1. Acute respiratory failure due to multifocal  viral pneumonia, intubated on 2/21/20, status post bronchoscopy on 2/21 and  this AM, mucous plugging washed out, CT scan of chest showed multifocal dense airspace consolidation. 2.  Currently blood pressure is stable lactic acid, sepsis resolving  3.  Multifocal pneumonia most likely due to viral versus bacterial,  respiratory cultures revealed MSSA, continue with cefepime and Levaquin continue cefepime. Also, will continue  oseltamivir for influenza A  4. Atrial fibrillation, RVR, diltiazem drip  5.  Hypokalemia: Potassium replacement protocol  6.  AST and elevated elevation may be due to sepsis now is resolved.       Diet: DIET TUBE FEED CONTINUOUS/CYCLIC NPO; Low Calorie High Protein; Orogastric; Continuous; 50; 24  Code:Full Code  DVT PPX pamela Monaco MD   2/26/2020 11:30 PM

## 2020-02-27 NOTE — PROGRESS NOTES
02/26/20 2314   Vent Patient Data   Plateau Pressure 32 RHO23   Static Compliance 19 mL/cmH2O   Dynamic Compliance 15 mL/cmH2O   Total PEEP 8.3 cmH20   Auto PEEP 0.3 cmH20

## 2020-02-27 NOTE — PROGRESS NOTES
care time spent reviewing labs/films, examining patient, collaborating with other physicians but excluding procedures for life threatening organ failure is 33 minutes.       Jason Badillo MD

## 2020-02-27 NOTE — FLOWSHEET NOTE
Tachypneic, facial grimacing, eyes open with prolonged eye contact. Wiggled toes to command. Propofol increased for sedation.

## 2020-02-27 NOTE — PROGRESS NOTES
Peep was decreased to 5 by MD, saturations over next 10 minutes dropped into the upper 80's. Dr. Thomas Jointpriti notified and peep increased back to 8.  Saturations increased to 90-91

## 2020-02-27 NOTE — PROGRESS NOTES
Nutrition Assessment (Enteral Nutrition)    Type and Reason for Visit: Reassess    Nutrition Recommendations:   1. Recommend Vital High Protein (low calorie, high protein formula) at decreased goal rate 45 mL per hour. 2. Continue water bolus 200 mL q 4 hours. 3. Ensure head of bed is 30 - 45 degrees during continuous or bolus gastric feeding and for one hour after bolus. Turn off the feeding if head of bed is lowered less than 30 degrees. 4. Monitor for tolerance (residuals greater than 500 mL, bowel habits, N/V, cramping). 5. Monitor propofol rate and adjust recommendations appropriately. Nutrition Assessment: Pt's nutrition status is stable. Pt tolerating Vital High Protein at 50 mL per hour. Residuals WNL. Small BM 2/26. Na+ 149; water bolus 200 mL q 4 hours and IV fluids to continue. Propofol rate has increased since last RD assessment; will decrease rate to prevent overfeeding on vent. Malnutrition Assessment:  · Malnutrition Status: No malnutrition    Nutrition Risk Level: High    Nutrition Needs:  · Estimated Daily Total Kcal: 2086-0649   · Estimated Daily Protein (g): 109 grams   · Estimated Daily Fluid (ml/day): 6673-8374 mL    Nutrition Diagnosis:   · Problem: Inadequate oral intake  · Etiology: related to Impaired respiratory function-inability to consume food     Signs and symptoms:  as evidenced by Intubation    Objective Information:  · Nutrition-Focused Physical Findings: +1 non-pitting BUE/BLE edema. +8.6 liters.    · Wound Type: None  · Current Nutrition Therapies:  · Oral Diet Orders: NPO   · Oral Diet intake: NPO  · Oral Nutrition Supplement (ONS) Orders: None  · ONS intake: NPO  · Tube Feeding (TF) Orders:   · Feeding Route: Orogastric  · Formula: Low Calorie, High Protein  · Duration: Continuous  · Additives/Modulars:    · Water Flushes: 200 mL q 4 hours  · Current TF & Flush Orders Provides: Vital High Protein at 50 mL per hour to provide 1200 mL total volume, 1200 calories, 105 grams protein, 1003 mL free water  · Goal TF & Flush Orders Provides: Vital High Protein at 45 mL per hour to provide 1080 mL total volume, 1080 calories, 95 grams protein, 903 mL free water  · Additional Calories: Propofol at 20 mL per hour to provide 528 calories from fat daily   · Anthropometric Measures:  · Ht: 5' 4\" (162.6 cm)   · Current Body Wt: 257 lb (116.6 kg)  · Admission Body Wt: 242 lb 8.1 oz (110 kg)  · Ideal Body Wt: 120 lb (54.4 kg),   · BMI Classification: BMI > or equal to 40.0 Obese Class III    Nutrition Interventions:   Continue current Tube Feeding  Continued Inpatient Monitoring, Education Not Indicated    Nutrition Evaluation:   · Evaluation: Goal achieved   · Goals: Pt will tolerate EN at goal    · Monitoring: TF Intake, TF Tolerance, Pertinent Labs, I&O, Monitor Bowel Function      Electronically signed by Marleni Goldsmith RD, LD on 2/27/20 at 8:16 AM    Contact Number: [phone]

## 2020-02-27 NOTE — PLAN OF CARE
Nutrition Problem: Inadequate oral intake  Intervention: Food and/or Nutrient Delivery: Continue current Tube Feeding  Nutritional Goals: Pt will tolerate EN at goal

## 2020-02-28 NOTE — PROGRESS NOTES
[facility] PROGRESS NOTE    2/27/2020 10:52 PM        Name: Quintin Leung . Admitted: 2/19/2020  Primary Care Provider: Referring Not In System (Inactive) (Tel: None)                        Hospital course:      59 y. o. female who has history of depression and hypertension shortness of breath presented to [facility] with admitted to another emergency room there is no bed in the ICU and patient transferred to our facility.  days history of increasing shortness of breath and flulike symptoms.  Went to primary care physician office and prescribed prednisone by mouth however never filled it.  Her saturation in the emergency room was 50% on room air.  Patient placed on nonrebreather at 15 L and hardly increased desaturation to 90%.  Her initial temp was 100.1, pulse rate 108, blood pressure 152/94.    Influenza A was positive and Tamiflu started, patient transferred to [facility] ICU. Patient intubated on 2/21/2020, remains intubated and sedated       Subjective: Intubated and sedated  No acute events overnight. Resting well. Pain control. Diet ok. Labs reviewed  Denies any chest pain sob.      Reviewed interval ancillary notes    Current Medications  sennosides-docusate sodium (SENOKOT-S) 8.6-50 MG tablet 2 tablet, Daily  metoprolol tartrate (LOPRESSOR) tablet 25 mg, BID  sodium chloride (Inhalant) 3 % nebulizer solution 3 mL, TID  0.45 % sodium chloride infusion, Continuous  voriconazole (VFEND) 315 mg in dextrose 5 % 100 mL IVPB, Q12H  insulin lispro (1 Unit Dial) 0-6 Units, Q4H  potassium chloride 20 mEq/50 mL IVPB (Central Line), PRN  propofol injection, Titrated  fentaNYL (SUBLIMAZE) 1,000 mcg in sodium chloride 0.9 % 100 mL infusion, Continuous  chlorhexidine (PERIDEX) 0.12 % solution 15 mL, BID  albuterol sulfate  (90 Base) MCG/ACT inhaler 6 Recent Labs     02/25/20  0545 02/26/20  0545 02/26/20  1705 02/27/20  0520   * 151*  --  149*   K 3.2* 3.7 3.6 3.6   * 112*  --  110   CO2 27 30  --  32   BUN 40* 43*  --  36*   CREATININE 0.6 0.6  --  0.5*   GLUCOSE 127* 120*  --  117*         Problem List  Principal Problem:    Acute on chronic respiratory failure with hypoxia and hypercapnia (HCC)  Active Problems:    Severe sepsis (HCC)    Influenza A    Multifocal pneumonia    Acute hypoxemic respiratory failure (HCC)  Resolved Problems:    * No resolved hospital problems. *            Assessment & Plan:      1. Acute respiratory failure due to multifocal  viral pneumonia, intubated on 2/21/20, status post bronchoscopy on 2/21 and  this AM, mucous plugging washed out, CT scan of chest showed multifocal dense airspace consolidation. Influenza aspergillus and staph. 2.  Currently blood pressure is stable lactic acid, sepsis resolving  3.  Multifocal pneumonia most likely due to viral versus bacterial,  respiratory cultures revealed MSSA, continue with cefepime and Levaquin continue cefepime. Also, will continue  oseltamivir for influenza A    4. ARDS  - Management per pulm. Vent setting adjusted. 4.  Atrial fibrillation, RVR, diltiazem drip  5.  Hypokalemia: Potassium replacement protocol  6.  AST and elevated elevation may be due to sepsis now is resolved. Diet: DIET TUBE FEED CONTINUOUS/CYCLIC NPO; Low Calorie High Protein; Orogastric; Continuous; 24  Code:Full Code  DVT PPX lovenox     30- min critical care time spent.      Leisa Chun MD   2/27/2020 10:52 PM

## 2020-02-28 NOTE — PROGRESS NOTES
02/28/20 0716   Vent Patient Data   Plateau Pressure 37 [id number]   Static Compliance 15.96 mL/cmH2O   Dynamic Compliance 13.61 mL/cmH2O

## 2020-02-28 NOTE — PROGRESS NOTES
No acute changes noted on reassessment, see flowsheets. VSS and afebrile. BP remains soft. Vent settings unchanged at this time. Repositioned to semi fowlers. Will continue to monitor.  Elliott Son RN, BSN, CCRN-CMC, 12:34 AM

## 2020-02-28 NOTE — PROGRESS NOTES
Assessment completed, patient opening eyes, moving arms spontaneously but nothing to command. Coughing frequently, looking around but not making eye contact. Patient's expression looks to be that she is uncomfortable. See eMAR for adjustment of propofol for patient's comfort. Tube feeding continues at 45 ml/hr via OG. Remains on ventilator, tolerating change to peep and rate well.

## 2020-02-28 NOTE — PROGRESS NOTES
02/27/20 1911   Vent Patient Data   Plateau Pressure 36 [id number]   Static Compliance 16 mL/cmH2O   Dynamic Compliance 13.7 mL/cmH2O

## 2020-02-28 NOTE — PLAN OF CARE
Problem: Risk for Impaired Skin Integrity  Goal: Tissue integrity - skin and mucous membranes  Description  Structural intactness and normal physiological function of skin and  mucous membranes. Outcome: Ongoing  Note:   Patient is at risk for impaired skin integrity. Repositioning patient every two hours. Skin is assessed every shift and prn. Barrier cream applied for incontinence as needed. Garza in place for fluid management. Problem: Falls - Risk of:  Goal: Will remain free from falls  Description  Will remain free from falls  Outcome: Ongoing  Note:   Patient is a high fall risk. Patient free of falls this shift. Bed low, locked and alarmed at all times. Yellow blanket on bed, arm band on wrist and fall sign posted in the room. Problem: Gas Exchange - Impaired:  Goal: Levels of oxygenation will improve  Description  Levels of oxygenation will improve  Outcome: Ongoing  Note:   O2 sats 90-92% on FiO2 40% and PEEP 8. Problem: Nutrition  Goal: Optimal nutrition therapy  Outcome: Ongoing  Note:   TF infusing at goal rate of 45ml/hr with 200cc flush Q4H. Tolerating well. Problem: Pain:  Goal: Control of acute pain  Description  Control of acute pain  Outcome: Ongoing  Note:   Fentanyl gtt for pain control. CPOT 0, assessed Q2H.

## 2020-02-28 NOTE — PROGRESS NOTES
Perfect Serve sent to Dr. Toi Trujillo RE: CT results are back. patient desats into mid 80's initially with turning to left side every time but rebounds back to 90. When transporting to CT patient desated into the 80's when placed on the transport ventilator, but was bagged to and from CT with sats in 90's. Order received to have 16 gauge angiocath and chest tube available at bedside.

## 2020-02-28 NOTE — PROGRESS NOTES
100 [facility] PROGRESS NOTE    2/28/2020 5:36 PM        Name: Shilpa Duran . Admitted: 2/19/2020  Primary Care Provider: Referring Not In System (Inactive) (Tel: None)                        Hospital course:      59 y. o. female who has history of depression and hypertension shortness of breath presented to [facility] with admitted to another emergency room there is no bed in the ICU and patient transferred to our facility.  days history of increasing shortness of breath and flulike symptoms.  Went to primary care physician office and prescribed prednisone by mouth however never filled it.  Her saturation in the emergency room was 50% on room air.  Patient placed on nonrebreather at 15 L and hardly increased desaturation to 90%.  Her initial temp was 100.1, pulse rate 108, blood pressure 152/94.    Influenza A was positive and Tamiflu started, patient transferred to [facility] ICU. Patient intubated on 2/21/2020, remains intubated and sedated       Subjective: Intubated and sedated  No acute events overnight. Resting well. Pain control. Diet ok. Labs reviewed  Denies any chest pain sob.      Reviewed interval ancillary notes    Current Medications  sennosides-docusate sodium (SENOKOT-S) 8.6-50 MG tablet 2 tablet, Daily  metoprolol tartrate (LOPRESSOR) tablet 25 mg, BID  sodium chloride (Inhalant) 3 % nebulizer solution 3 mL, TID  voriconazole (VFEND) 315 mg in dextrose 5 % 100 mL IVPB, Q12H  insulin lispro (1 Unit Dial) 0-6 Units, Q4H  potassium chloride 20 mEq/50 mL IVPB (Central Line), PRN  propofol injection, Titrated  fentaNYL (SUBLIMAZE) 1,000 mcg in sodium chloride 0.9 % 100 mL infusion, Continuous  chlorhexidine (PERIDEX) 0.12 % solution 15 mL, BID  albuterol sulfate  (90 Base) MCG/ACT inhaler 6 puff, Q4H  ipratropium (ATROVENT HFA) 17 MCG/ACT inhaler 6 puff, Q4H  lidocaine PF 1 % injection 5 mL, Once  bacitracin-polymyxin b (POLYSPORIN) ointment, Once  pantoprazole (PROTONIX) injection 40 mg, Daily  enoxaparin (LOVENOX) injection 40 mg, Daily  sodium chloride flush 0.9 % injection 10 mL, 2 times per day  sodium chloride flush 0.9 % injection 10 mL, PRN  acetaminophen (TYLENOL) tablet 650 mg, Q6H PRN  acetaminophen (TYLENOL) suppository 650 mg, Q6H PRN  polyethylene glycol (GLYCOLAX) packet 17 g, Daily PRN  promethazine (PHENERGAN) tablet 12.5 mg, Q6H PRN  ondansetron (ZOFRAN) injection 4 mg, Q6H PRN        Objective:  BP (!) 91/43   Pulse 68   Temp 100.8 °F (38.2 °C) (Temporal)   Resp 23   Ht 5' 4\" (1.626 m)   Wt 263 lb 3.7 oz (119.4 kg)   SpO2 94%   BMI 45.18 kg/m²     Intake/Output Summary (Last 24 hours) at 2/28/2020 1736  Last data filed at 2/28/2020 1400  Gross per 24 hour   Intake 4272.9 ml   Output 1800 ml   Net 2472.9 ml      Wt Readings from Last 3 Encounters:   02/28/20 263 lb 3.7 oz (119.4 kg)       General appearance:  lady, intubated and sedated, appears comfortable  Eyes: Sclera clear. Pupils equal.  ENT: Moist oral mucosa. Trachea midline, no adenopathy. Cardiovascular: Regular rhythm, normal S1, S2. No murmur. No edema in lower extremities  Respiratory: Not using accessory muscles. Good inspiratory effort. Clear to auscultation bilaterally, no wheeze or crackles. GI: Abdomen soft, no tenderness, not distended, normal bowel sounds  Musculoskeletal: No cyanosis in digits, neck supple  Neurology: CN 2-12 grossly intact. No speech or motor deficits  Psych: Normal affect.   Intubated and sedated   Skin: Warm, dry, normal turgor      Labs and Tests:  CBC:   Recent Labs     02/26/20  0545 02/26/20  1224 02/27/20  0520 02/28/20  0403   WBC 15.5*  --  13.5* 11.9*   HGB 7.8* 7.7* 7.4* 7.4*     --  219 253     BMP:    Recent Labs     02/26/20  0545 02/26/20  1705 02/27/20  0520 02/28/20  0403   *  --  149* 145

## 2020-02-28 NOTE — PROGRESS NOTES
Pulmonary & Critical Care Medicine ICU Progress Note    Admit Date: 2020  PCP: Referring Not In System (Inactive)    CC:  acute hypoxemic respiratory failure, influenza, aspergillosis, staph pneumonia  Events of Last 24 hours:  Remains intubated and sedated. Tmax of 100.2 overnight. CT chest confirmed EMELIA pneumothorax. Vitals:  Tmax:  VITALS:  BP (!) 96/50   Pulse 67   Temp 100.2 °F (37.9 °C) (Temporal)   Resp 24   Ht 5' 4\" (1.626 m)   Wt 263 lb 3.7 oz (119.4 kg)   SpO2 93%   BMI 45.18 kg/m²   24HR INTAKE/OUTPUT:      Intake/Output Summary (Last 24 hours) at 2020 0814  Last data filed at 2020 0548  Gross per 24 hour   Intake 4859.9 ml   Output 1775 ml   Net 3084.9 ml     CURRENT PULSE OXIMETRY:  SpO2: 93 %  24HR PULSE OXIMETRY RANGE:  SpO2  Av %  Min: 89 %  Max: 93 %      Vent:  D# 8    Vent Settings:  Vent Mode: AC/VC Rate Set: 24 bmp/Vt Ordered: 400 mL/ /FiO2 : 40 %  PEEP 8  Recent Labs     20  1224 20  0520   PHART 7.433 7.418   SWX3WMF 47.7* 50.6*   PO2ART 77.3 87.1         EXAM:  General: No distress. Sedated. Eyes: PERRL. No sclera icterus. No conjunctival injection. ENT: ETT in place  Neck: Trachea midline. Normal thyroid. Resp: No accessory muscle use. No crackles. No wheezing. Coarse breath sounds  CV: Regular rate. Regular rhythm. No mumur or rub. No edema. GI: Non-tender. Non-distended. No masses. No organmegaly. Normal bowel sounds. Skin: Warm and dry. No nodule on exposed extremities. No rash on exposed extremities. Lymph: No cervical LAD. No supraclavicular LAD. M/S: No cyanosis. No joint deformity. No clubbing. Neuro:Intubated, sedated. Will not follow commands. Positive pupils/gag/corneals. Normal pain response. Psych: Intubated, sedated.        IV:   sodium chloride 75 mL/hr at 02/27/20 2055    propofol 25 mcg/kg/min (20 0808)    fentaNYL (SUBLIMAZE) infusion 0.5 mcg/kg/hr (20 1900)         Scheduled Meds:     despite being on positive pressure. Keeping line cart outside room and have a 16 gauge angiocath next to the sharps container should she decompensate. ID:  Continue VFend for the aspergillus. Completed tamiflu and combination cefepime and Levaquin. We will leave the course for the aspergillus open-ended until patient has stabilized. Renal:  No issues. GI: tolerating TFs, had a small BM yesterday    :  Good uop    FEN:  Tolerating TFs and moving bowels. Hypernatremia resolved. Stop d5 1/2 normal.  Continue free water in TFs at 200mL q 4hr. Prophylaxis:  Head of bed maintained at >30 degrees at all times except for procedures and repositioning. Pantoprazole, SQ enoxaparin and SCD's       Critical care time spent reviewing labs/films, examining patient, collaborating with other physicians but excluding procedures for life threatening organ failure is 35 minutes.       Jeffrey Longoria MD

## 2020-02-28 NOTE — PROGRESS NOTES
Assessment completed and unchanged from last assessment completed, remains on ventilator, settings unchanged, remains sedated on propofol and fentanyl gtts, tube feeding continues at 45 ml/hr via OG with water boluses of 200 ml q4hr. No distress present.

## 2020-02-29 NOTE — PROGRESS NOTES
100 [facility] PROGRESS NOTE    2/29/2020 6:12 PM        Name: Quintin Leung . Admitted: 2/19/2020  Primary Care Provider: Referring Not In System (Inactive) (Tel: None)                        Hospital course:      59 y. o. female who has history of depression and hypertension shortness of breath presented to [facility] with admitted to another emergency room there is no bed in the ICU and patient transferred to our facility.  days history of increasing shortness of breath and flulike symptoms.  Went to primary care physician office and prescribed prednisone by mouth however never filled it.  Her saturation in the emergency room was 50% on room air.  Patient placed on nonrebreather at 15 L and hardly increased desaturation to 90%.  Her initial temp was 100.1, pulse rate 108, blood pressure 152/94.    Influenza A was positive and Tamiflu started, patient transferred to [facility] ICU. Patient intubated on 2/21/2020, remains intubated and sedated       Subjective: Intubated and sedated  No acute events overnight. Resting well. Pain control. Diet ok. Labs reviewed  Denies any chest pain sob.      Reviewed interval ancillary notes    Current Medications  sennosides-docusate sodium (SENOKOT-S) 8.6-50 MG tablet 2 tablet, Daily  metoprolol tartrate (LOPRESSOR) tablet 25 mg, BID  sodium chloride (Inhalant) 3 % nebulizer solution 3 mL, TID  voriconazole (VFEND) 315 mg in dextrose 5 % 100 mL IVPB, Q12H  insulin lispro (1 Unit Dial) 0-6 Units, Q4H  potassium chloride 20 mEq/50 mL IVPB (Central Line), PRN  propofol injection, Titrated  fentaNYL (SUBLIMAZE) 1,000 mcg in sodium chloride 0.9 % 100 mL infusion, Continuous  chlorhexidine (PERIDEX) 0.12 % solution 15 mL, BID  albuterol sulfate  (90 Base) MCG/ACT inhaler 6 puff, Q4H  ipratropium (ATROVENT HFA) 17 MCG/ACT inhaler 6 puff, Q4H  lidocaine PF 1 % injection 5 mL, Once  bacitracin-polymyxin b (POLYSPORIN) ointment, Once  pantoprazole (PROTONIX) injection 40 mg, Daily  enoxaparin (LOVENOX) injection 40 mg, Daily  sodium chloride flush 0.9 % injection 10 mL, 2 times per day  sodium chloride flush 0.9 % injection 10 mL, PRN  acetaminophen (TYLENOL) tablet 650 mg, Q6H PRN  acetaminophen (TYLENOL) suppository 650 mg, Q6H PRN  polyethylene glycol (GLYCOLAX) packet 17 g, Daily PRN  promethazine (PHENERGAN) tablet 12.5 mg, Q6H PRN  ondansetron (ZOFRAN) injection 4 mg, Q6H PRN        Objective:  BP (!) 110/54   Pulse 71   Temp 98.9 °F (37.2 °C) (Temporal)   Resp 22   Ht 5' 4\" (1.626 m)   Wt 262 lb 9.1 oz (119.1 kg)   SpO2 93%   BMI 45.07 kg/m²     Intake/Output Summary (Last 24 hours) at 2/29/2020 1812  Last data filed at 2/29/2020 1346  Gross per 24 hour   Intake 3052 ml   Output 2150 ml   Net 902 ml      Wt Readings from Last 3 Encounters:   02/29/20 262 lb 9.1 oz (119.1 kg)       General appearance:  lady, intubated and sedated, appears comfortable  Eyes: Sclera clear. Pupils equal.  ENT: Moist oral mucosa. Trachea midline, no adenopathy. Cardiovascular: Regular rhythm, normal S1, S2. No murmur. No edema in lower extremities  Respiratory: Not using accessory muscles. Good inspiratory effort. Clear to auscultation bilaterally, no wheeze or crackles. GI: Abdomen soft, no tenderness, not distended, normal bowel sounds  Musculoskeletal: No cyanosis in digits, neck supple  Neurology: CN 2-12 grossly intact. No speech or motor deficits  Psych: Normal affect.   Intubated and sedated   Skin: Warm, dry, normal turgor      Labs and Tests:  CBC:   Recent Labs     02/27/20  0520 02/28/20  0403 02/29/20  0423   WBC 13.5* 11.9* 12.0*   HGB 7.4* 7.4* 8.1*    253 260     BMP:    Recent Labs     02/27/20  0520 02/28/20  0403 02/29/20  0423   * 145 143   K 3.6 3.6 3.8    106 103   CO2 32 30 32   BUN

## 2020-02-29 NOTE — PROGRESS NOTES
[facility] Pulmonary/CCM Progress note      Admit Date: 2/19/2020    Chief Complaint: Shortness of breath    Subjective: Interval History: Tolerating PEEP of 5, no O2 desaturations noted significant enough overnight. Still continues to have low-grade fevers of 100.2F. Hemodynamically stable, great urine output. Scheduled Meds:   sennosides-docusate sodium  2 tablet Oral Daily    metoprolol tartrate  25 mg Per NG tube BID    sodium chloride (Inhalant)  3 mL Nebulization TID    voriconazole  4 mg/kg (Adjusted) Intravenous Q12H    insulin lispro  0-6 Units Subcutaneous Q4H    chlorhexidine  15 mL Mouth/Throat BID    albuterol sulfate HFA  6 puff Inhalation Q4H    ipratropium  6 puff Inhalation Q4H    lidocaine PF  5 mL Intradermal Once    bacitracin-polymyxin b   Topical Once    pantoprazole  40 mg Intravenous Daily    enoxaparin  40 mg Subcutaneous Daily    sodium chloride flush  10 mL Intravenous 2 times per day     Continuous Infusions:   propofol 35 mcg/kg/min (02/29/20 0429)    fentaNYL (SUBLIMAZE) infusion 0.5 mcg/kg/hr (02/29/20 0428)     PRN Meds:potassium chloride, sodium chloride flush, acetaminophen, acetaminophen, polyethylene glycol, promethazine, ondansetron    Review of Systems  Unable to obtain since currently patient is intubated and sedated    Objective:     Patient Vitals for the past 8 hrs:   BP Temp Temp src Pulse Resp SpO2 Height Weight   02/29/20 0600 (!) 97/49 -- -- 66 23 93 % -- --   02/29/20 0500 (!) 95/47 -- -- 65 24 92 % -- --   02/29/20 0400 (!) 104/58 98.2 °F (36.8 °C) Temporal 67 28 91 % 5' 4\" (1.626 m) 262 lb 9.1 oz (119.1 kg)   02/29/20 0330 -- -- -- 66 22 93 % -- --   02/29/20 0300 (!) 94/46 -- -- 69 24 91 % -- --   02/29/20 0200 (!) 98/50 -- -- 67 21 91 % -- --   02/29/20 0100 (!) 98/51 -- -- 66 25 91 % -- --   02/29/20 0000 (!) 101/51 98.8 °F (37.1 °C) Temporal 68 26 92 % -- --     I/O last 3 completed shifts:   In: 12 [I.V.:912; NG/GT:2042; IV Piggyback:317]  Out: 2050 [Urine:2050]  No intake/output data recorded. General Appearance: Intubated and sedated, in no acute distress  Skin: warm and dry, no rash or erythema  Head: normocephalic and atraumatic  Eyes: pupils equal, round, and reactive to light, extraocular eye movements intact, conjunctivae normal  ENT: external ear and ear canal normal bilaterally, nose without deformity, nasal mucosa and turbinates normal  Neck: supple and non-tender without mass, no cervical lymphadenopathy  Pulmonary/Chest: clear to auscultation bilaterally- no wheezes, rales or rhonchi, normal air movement, no respiratory distress and rales present-coarse, bilateral  Cardiovascular: normal rate, regular rhythm,  no murmurs, rubs, distal pulses intact, no carotid bruits  Abdomen: soft, non-tender, non-distended, normal bowel sounds, no masses or organomegaly  Lymph Nodes: Cervical, supraclavicular normal  Extremities: no cyanosis, clubbing or edema  Musculoskeletal: normal range of motion, no joint swelling, deformity or tenderness  Neurologic: Sedated, no focal neurologic deficits    Data Review:  CBC:   Lab Results   Component Value Date    WBC 12.0 02/29/2020    RBC 2.69 02/29/2020     BMP:   Lab Results   Component Value Date    GLUCOSE 102 02/29/2020    CO2 32 02/29/2020    BUN 27 02/29/2020    CREATININE <0.5 02/29/2020    CALCIUM 8.5 02/29/2020     ABG:   Lab Results   Component Value Date    AJO2EVW 32.7 02/27/2020    BEART 7.3 02/27/2020    U6YTZXAU 97.2 02/27/2020    PHART 7.418 02/27/2020    GIY0DET 50.6 02/27/2020    PO2ART 87.1 02/27/2020    LJW2EQT 76.7 02/27/2020       Radiology: All pertinent images / reports were reviewed as a part of this visit.     Narrative   EXAMINATION:   ONE XRAY VIEW OF THE CHEST       2/28/2020 5:29 am       COMPARISON:   02/27/2020       HISTORY:   ORDERING SYSTEM PROVIDED HISTORY: routine   TECHNOLOGIST PROVIDED HISTORY:   Reason for exam:->routine       FINDINGS:   ET tube and left-sided central line appear unchanged       Heterogeneous opacity is seen throughout the right lung, similar to prior       Heterogeneous opacity is seen throughout the left lung, similar to prior       No obvious change in left apical pneumothorax.           Impression   No change in left apical pneumothorax       No change in multifocal airspace disease       Problem List:   Acute hypoxic respiratory failure  Multifocal pneumonia-influenza/MSSA and Aspergillus  ARDS  Mucous plugging of airway  Small left pneumothorax  Assessment/Plan:     Acute hypoxic respiratory failure related to multifocal pneumonia/ARDS-improving ventilator requirements. Currently PEEP at 5, FiO2 40%, on volume assist control mode of ventilation. Chest x-ray from today was reviewed and shows persistent small left apical pneumothorax with significant bilateral infiltrates. Pneumothorax is stable and will only closely monitor, no evidence of subcutaneous emphysema. Patient has had multiple bronchoscopies for airway clearance/mucous plugging. Hopefully we should be able to extubate by early next week, otherwise consider tracheostomy. Perform sedation vacation and spontaneous breathing trial if tolerated. Influenza/MSSA and aspergillus related pneumonia-completed a course of antibiotics and Tamiflu. Continues on voriconazole as per ID. Still has low-grade temperatures. WBC 12. Hemodynamically stable, renal function is good-adequate urine output. No vasopressor needs. Tube feeds at goal.  Sodium 143. Artis Currie MD     Critical care time of 32 minutes excluding procedures.

## 2020-02-29 NOTE — PROGRESS NOTES
Report received from Ko Sifuentes, [facility]. Shift assessment completed- see doc flow sheet for details. Pt remains on the following gtts: Fentanyl @ 0.5 mcg/kg/min, Propofol @ 35 mcg/kg/min, NS @ kvo. Sedation vacation performed. Pt opens eyes, responds to voice and pain, no commands at this time. Small oral and ET secretions noted. Lungs rhonchi and diminished. Tolerated TF at goal rate- 50 cc residual noted. Edema generalized + 2. Repositioned in bed for comfort. Medications passed- see MAR. Afebrile at this time. Dr. Brian Cross at bedside and updated- continue current plan of care. Will continue to monitor. Juve Dunlap RN, BSN, CCRN.

## 2020-03-01 NOTE — PROGRESS NOTES
Friend Leny at bedside. Updated on patient status and POC, all questions answered. Denies additional needs. Will continue to monitor.  Carlos Enrique Otoole RN, BSN, CCRN-CMC, 10:14 PM

## 2020-03-01 NOTE — PROGRESS NOTES
03/01/20 0820   Vent Patient Data   Plateau Pressure 14 [id number]   Static Compliance 30 mL/cmH2O   Dynamic Compliance 17 mL/cmH2O

## 2020-03-01 NOTE — PROGRESS NOTES
Per RN requested  Placed pt on CPAP 8/5 40% for a SBT pt did okay until RR reached high 30's after 10 mins ended trial at that time

## 2020-03-01 NOTE — PLAN OF CARE
Problem: Risk for Impaired Skin Integrity  Goal: Tissue integrity - skin and mucous membranes  Description  Structural intactness and normal physiological function of skin and  mucous membranes. Outcome: Ongoing  Note:   Patient is at risk for impaired skin integrity. Repositioning patient every two hours. Skin is assessed every shift and prn. Barrier cream applied for incontinence as needed. Garza in place for fluid management. Mepilex to coccyx. Abrasion noted to forehead. Problem: Falls - Risk of:  Goal: Will remain free from falls  Description  Will remain free from falls  Outcome: Ongoing  Note:   Patient is a high fall risk. Patient free of falls this shift. Bed low, locked and alarmed at all times. Call light and bedside table is within reach. Yellow blanket on bed, arm band on wrist and fall sign posted in the room. Problem: Nutrition  Goal: Optimal nutrition therapy  Outcome: Ongoing  Note:   TF continues at goal rate of 45ml/hr with 200cc flush Q4H. BS hypoactive. Positive flatus. No documented BM since 2/27. Problem: Pain:  Goal: Pain level will decrease  Description  Pain level will decrease  Outcome: Ongoing  Note:   CPOT 0. Fentanyl gtt for pain control.

## 2020-03-01 NOTE — PROGRESS NOTES
[facility] PROGRESS NOTE    3/1/2020 10:11 AM        Name: Trey Calhoun . Admitted: 2/19/2020  Primary Care Provider: Referring Not In System (Inactive) (Tel: None)                        Hospital course:      59 y. o. female who has history of depression and hypertension shortness of breath presented to [facility] with admitted to another emergency room there is no bed in the ICU and patient transferred to our facility.  days history of increasing shortness of breath and flulike symptoms.  Went to primary care physician office and prescribed prednisone by mouth however never filled it.  Her saturation in the emergency room was 50% on room air.  Patient placed on nonrebreather at 15 L and hardly increased desaturation to 90%.  Her initial temp was 100.1, pulse rate 108, blood pressure 152/94.    Influenza A was positive and Tamiflu started, patient transferred to [facility] ICU. Patient intubated on 2/21/2020, remains intubated and sedated       Subjective: Intubated and sedated  No acute events overnight. Resting well. Pain control. Diet ok. Labs reviewed  Denies any chest pain sob.      Reviewed interval ancillary notes    Current Medications  sennosides-docusate sodium (SENOKOT-S) 8.6-50 MG tablet 2 tablet, Daily  metoprolol tartrate (LOPRESSOR) tablet 25 mg, BID  sodium chloride (Inhalant) 3 % nebulizer solution 3 mL, TID  voriconazole (VFEND) 315 mg in dextrose 5 % 100 mL IVPB, Q12H  insulin lispro (1 Unit Dial) 0-6 Units, Q4H  potassium chloride 20 mEq/50 mL IVPB (Central Line), PRN  propofol injection, Titrated  fentaNYL (SUBLIMAZE) 1,000 mcg in sodium chloride 0.9 % 100 mL infusion, Continuous  chlorhexidine (PERIDEX) 0.12 % solution 15 mL, BID  albuterol sulfate  (90 Base) MCG/ACT inhaler 6 puff, Q4H  ipratropium (ATROVENT HFA) 17

## 2020-03-01 NOTE — PROGRESS NOTES
03/01/20 0820   Vent Patient Data   Plateau Pressure 14 [id number]   Static Compliance 30 mL/cmH2O   Dynamic Compliance 17 mL/cmH2O

## 2020-03-01 NOTE — PROGRESS NOTES
Shift assessment complete, see flowsheet. VSS, afebrile. Pt remains sedated on vent. #7.5 ETT, 21 lip, AC 20, , FiO2 40%, PEEP 5. Lungs dim throughout with exp wheezes and rhonchi, O2 sat stable. OG in place, TF infusing at 45ml/hr with [id number] water flush. BS hypoactive. Metropolol held due to soft BP. Pt repositioned, will continue to monitor.

## 2020-03-01 NOTE — PROGRESS NOTES
Propofol and Fentanyl gtts paused for sedation vacation, see MAR.  Bren Miguel RN, BSN, CCRN-[facility], 4:51 AM

## 2020-03-01 NOTE — PROGRESS NOTES
Reassessment complete, see flowsheet. Pt failed spontaneous breathing trial this afternoon with RR up to 50 within 2 minutes. Pt switched back to previous vent settings. Sedation vacation: Pt was unable to maintain eye contact, or follow commands, shook head to simple yes/no questions. Pt repositioned for comfort, will continue to monitor.

## 2020-03-01 NOTE — PROGRESS NOTES
Sedation vacation completed. O2 sats increased to 99% and sustaining. Unable to make eye contact, but nods/shakes head appropriately in response to questions. When asked to squeeze hand on command, patient moves entire arm. Able to wiggle toes on command but very minimal/weak. Sedation restarted at approx 1/2 previous gtt rate. Plan for SBT approx 0600. Will continue to monitor.  Rafael Gonzalez RN, BSN, CCRN-[facility], 5:04 AM

## 2020-03-01 NOTE — PROGRESS NOTES
[facility] Pulmonary/CCM Progress note      Admit Date: 2/19/2020    Chief Complaint: Shortness of breath    Subjective: Interval History: Did not tolerate spontaneous breathing trial well, with increased tachypnea. Sounds very congested. Chest x-ray reveals no residual left pneumothorax. Oxygenation is acceptable, PEEP of 5. Off vasopressors. Scheduled Meds:   sennosides-docusate sodium  2 tablet Oral Daily    metoprolol tartrate  25 mg Per NG tube BID    sodium chloride (Inhalant)  3 mL Nebulization TID    voriconazole  4 mg/kg (Adjusted) Intravenous Q12H    insulin lispro  0-6 Units Subcutaneous Q4H    chlorhexidine  15 mL Mouth/Throat BID    albuterol sulfate HFA  6 puff Inhalation Q4H    ipratropium  6 puff Inhalation Q4H    lidocaine PF  5 mL Intradermal Once    bacitracin-polymyxin b   Topical Once    pantoprazole  40 mg Intravenous Daily    enoxaparin  40 mg Subcutaneous Daily    sodium chloride flush  10 mL Intravenous 2 times per day     Continuous Infusions:   propofol 20 mcg/kg/min (03/01/20 0602)    fentaNYL (SUBLIMAZE) infusion 0.5 mcg/kg/hr (03/01/20 0458)     PRN Meds:potassium chloride, sodium chloride flush, acetaminophen, acetaminophen, polyethylene glycol, promethazine, ondansetron    Review of Systems  Unable to obtain since currently patient is intubated and sedated    Objective:     Patient Vitals for the past 8 hrs:   BP Temp Temp src Pulse Resp SpO2 Height Weight   03/01/20 0700 (!) 100/48 -- -- 68 24 -- -- --   03/01/20 0628 -- -- -- 74 -- 98 % -- --   03/01/20 0600 (!) 98/45 -- -- 69 22 -- -- --   03/01/20 0500 (!) 94/48 -- -- 71 24 -- -- --   03/01/20 0400 (!) 96/51 98.2 °F (36.8 °C) Temporal 67 23 92 % 5' 4\" (1.626 m) 267 lb 3.2 oz (121.2 kg)   03/01/20 0344 -- -- -- 67 20 91 % -- --   03/01/20 0300 (!) 98/51 -- -- 68 21 91 % -- --   03/01/20 0200 (!) 95/46 -- -- 69 21 91 % -- --   03/01/20 0100 (!) 96/50 -- -- 73 22 90 % -- --     I/O last 3 completed shifts:   In: 2739.3 [I.V.:925.3; NG/GT:1614; IV Piggyback:200]  Out: 2450 [Urine:2450]  No intake/output data recorded. General Appearance: Intubated and sedated, in no acute distress  Skin: warm and dry, no rash or erythema  Head: normocephalic and atraumatic  Eyes: pupils equal, round, and reactive to light, extraocular eye movements intact, conjunctivae normal  ENT: external ear and ear canal normal bilaterally, nose without deformity, nasal mucosa and turbinates normal  Neck: supple and non-tender without mass, no cervical lymphadenopathy  Pulmonary/Chest: clear to auscultation bilaterally- no wheezes, rales or rhonchi, normal air movement, no respiratory distress and rales present-coarse, bilateral  Cardiovascular: normal rate, regular rhythm,  no murmurs, rubs, distal pulses intact, no carotid bruits  Abdomen: soft, non-tender, non-distended, normal bowel sounds, no masses or organomegaly  Lymph Nodes: Cervical, supraclavicular normal  Extremities: no cyanosis, clubbing or edema  Musculoskeletal: normal range of motion, no joint swelling, deformity or tenderness  Neurologic: Sedated, no focal neurologic deficits    Data Review:  CBC:   Lab Results   Component Value Date    WBC 12.1 03/01/2020    RBC 2.35 03/01/2020     BMP:   Lab Results   Component Value Date    GLUCOSE 101 03/01/2020    CO2 32 03/01/2020    BUN 22 03/01/2020    CREATININE <0.5 03/01/2020    CALCIUM 8.4 03/01/2020     ABG:   Lab Results   Component Value Date    JGY4RDK 32.7 02/27/2020    BEART 7.3 02/27/2020    O7SQKAJK 97.2 02/27/2020    PHART 7.418 02/27/2020    MMJ2XAL 50.6 02/27/2020    PO2ART 87.1 02/27/2020    RST7AQF 76.7 02/27/2020       Radiology: All pertinent images / reports were reviewed as a part of this visit.     Narrative   EXAMINATION:   ONE XRAY VIEW OF THE CHEST       2/28/2020 5:29 am       COMPARISON:   02/27/2020       HISTORY:   ORDERING SYSTEM PROVIDED HISTORY: routine   TECHNOLOGIST PROVIDED HISTORY:   Reason for

## 2020-03-01 NOTE — PROGRESS NOTES
RT at bedside. Patient switched to spontaneous ventilation. O2 sats stable, %. HR/BP stable. Pulling adequate TV. After approx 10 minutes RR increased to 37-39/min. Shallow respirations. TV slowing decreasing. Patient switched back to [facility] ventilation approx 0630. Will continue to monitor.  Taurus Mathew RN, BSN, CCRN-[facility], 6:45 AM

## 2020-03-01 NOTE — PROGRESS NOTES
Spontaneous mode 12/5 attempted. Patient RR increased to 50 b/min after approximately 2 minutes on Spontaneous mode. Patient placed back on AC/VC, 20, 400, 40% and a peep of 5.

## 2020-03-02 NOTE — PROGRESS NOTES
Friend Leny at bedside. Updated on patient status and POC, all questions answered. Denies additional needs. Will continue to monitor.  Carola Brown RN, BSN, CCRN-[facility], 10:27 PM

## 2020-03-02 NOTE — PLAN OF CARE
Problem: Risk for Impaired Skin Integrity  Goal: Tissue integrity - skin and mucous membranes  Description  Structural intactness and normal physiological function of skin and  mucous membranes. 3/1/2020 2314 by Jackie Gunter RN  Outcome: Ongoing  Note:   Patient is at risk for impaired skin integrity. Repositioning patient every two hours. Skin is assessed every shift and prn. Barrier cream applied for incontinence as needed. Garza in place for fluid management. Mepilex to coccyx. Problem: Falls - Risk of:  Goal: Will remain free from falls  Description  Will remain free from falls  3/1/2020 2314 by Jackie Gunter RN  Outcome: Ongoing  Note:   Patient is a high fall risk. Patient free of falls this shift. Bed low, locked and alarmed at all times. Call light and bedside table is within reach. Yellow blanket on bed, arm band on wrist and fall sign posted in the room. Problem: Gas Exchange - Impaired:  Goal: Levels of oxygenation will improve  Description  Levels of oxygenation will improve  3/1/2020 2314 by Jackie Gunter RN  Outcome: Ongoing  Note:   O2 sats remain stable, 95-98%. FiO2 40% PEEP 5. Problem: Nutrition  Goal: Optimal nutrition therapy  3/1/2020 2314 by Jackie Gunter RN  Outcome: Ongoing  Note:   TF infusing at goal rate at 45ml/hr with 200cc flush Q4H.  Plan for NPO at midnight for Bronch in AM.

## 2020-03-02 NOTE — PROGRESS NOTES
Teaching / education initiated regarding perioperative experience, expectations, and pain management during stay. Patient is intubated and on sedation drips. Consent obtained by sister via phone. Reviewed patient's medical and surgical history in electronic record and with patient at the bedside. All questions regarding procedure answered. Scope number and equipment verified using a two person system.

## 2020-03-02 NOTE — PROGRESS NOTES
MCG/ACT inhaler 6 puff, Q4H  lidocaine PF 1 % injection 5 mL, Once  bacitracin-polymyxin b (POLYSPORIN) ointment, Once  pantoprazole (PROTONIX) injection 40 mg, Daily  enoxaparin (LOVENOX) injection 40 mg, Daily  sodium chloride flush 0.9 % injection 10 mL, 2 times per day  sodium chloride flush 0.9 % injection 10 mL, PRN  acetaminophen (TYLENOL) tablet 650 mg, Q6H PRN  polyethylene glycol (GLYCOLAX) packet 17 g, Daily PRN  promethazine (PHENERGAN) tablet 12.5 mg, Q6H PRN  ondansetron (ZOFRAN) injection 4 mg, Q6H PRN        Objective:  BP (!) 115/53   Pulse 68   Temp 99.2 °F (37.3 °C) (Temporal)   Resp 21   Ht 5' 4\" (1.626 m)   Wt 263 lb 7.2 oz (119.5 kg)   SpO2 96%   BMI 45.22 kg/m²     Intake/Output Summary (Last 24 hours) at 3/2/2020 1036  Last data filed at 3/2/2020 1000  Gross per 24 hour   Intake 2694.9 ml   Output 3275 ml   Net -580.1 ml      Wt Readings from Last 3 Encounters:   03/02/20 263 lb 7.2 oz (119.5 kg)       General appearance: intubated and sedated  Eyes: Sclera clear. Pupils equal.  ENT: Moist oral mucosa. Trachea midline, no adenopathy. Cardiovascular: Regular rhythm, normal S1, S2. No murmur. No edema in lower extremities  Respiratory: no wheezing + rhonchi  GI: Abdomen soft, no tenderness, not distended, normal bowel sounds  Musculoskeletal: No cyanosis in digits, neck supple  Neurology: intubated and sedated  Psych: Normal affect.   Intubated and sedated   Skin: Warm, dry, normal turgor      Labs and Tests:  CBC:   Recent Labs     02/29/20 0423 03/01/20 0413 03/02/20  0358   WBC 12.0* 12.1* 13.0*   HGB 8.1* 7.2* 7.2*    267 294     BMP:    Recent Labs     02/29/20 0423 03/01/20 0413 03/02/20  0358    140 141   K 3.8 3.7 3.9    102 101   CO2 32 32 33*   BUN 27* 22* 16   CREATININE <0.5* <0.5* <0.5*   GLUCOSE 102* 101* 89         Problem List  Principal Problem:    Acute on chronic respiratory failure with hypoxia and hypercapnia (HCC)  Active Problems:    Severe

## 2020-03-02 NOTE — PROGRESS NOTES
Sedation vacation completed. Unable to make eye contact, but nods/shakes head appropriately in response to questions. When asked to squeeze hand on command, patient moves entire hand. Able to wiggle toes on command but very minimal/weak. Sedation restarted at previous gtt rate. Plan for SBT approx 0600, will decrease sedation as patent tolerates. Will continue to monitor.  Kalpesh Sanchez RN, BSN, CCRN-[facility], 5:12 AM

## 2020-03-02 NOTE — CONSULTS
immunocompromise from this  · History of breast cancer  · Moderate mitral regurgitation  · Severe right ventricular dilatation on echo  · Non-smoker  · History of penicillin allergy  · Obesity Class 3 due to excess calorie intake : Body mass index is 45.22 kg/m². Discussion:      BAL cytology was negative for malignant cells. BAL cultures from 2/21/2020 are growing MSSA and at least 2 specimens and multiple specimens have grown Aspergillus fumigatus. She has been started on IV voriconazole by primary team on 2/25/2020. I reviewed her CT scan. Shows extensive bilateral lung infiltrates. She also has a small left-sided pneumothorax. Remains intubated and on a ventilator    The patient had a positive blood culture for MSSA on 2/19/2020 at outside hospital.  Also has MSSA pneumonia. Unfortunately, has not received adequate treatment for MSSA bacteremia. Received IV cefepime and vancomycin from 2/19/2020 to 2/26/2020    Has a listed allergy to penicillins. 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 Plan:     Diagnostic Workup:    · Will order voriconazole level  · Will order 2 sets of blood cultures today  · We will order tracheal aspirate culture  · Continue to follow fever curve, WBC count and blood cultures  · Follow up on liverand renal functions closely  · 2D echo findings concerning. Concomitant MSSA endocarditis needs to be effectively ruled out. Will ask for a transesophageal echo.   If not possible, will err on the side of treating as MSSA endocarditis    Antimicrobials:    · Will continue IV voriconazole for Aspergillus pneumonia coverage  · Please start IV cefazolin 2 g every 8 hour for MSSA pneumonia and MSSA bacteremia coverage  · Continue to monitor her vitals closely  · Endotracheal tube care and pulmonary toilet  · Continue ventilator support to maintain oxygen saturation above 92%  · Cough and deep breathing exercises  · Aspiration precautions  · Pressure ulcer prevention precautions  · DVT prophylaxis  · Discussed the above plan with MICU team    Drug Monitoring:    · Continue serial monitoring for antibiotic toxicity as follows:   · Continue to watch for following: new or worsening fever, hypotension, hives, lip swelling and redness or purulence at vascular access sites. I/v access Management:    · Continue to monitor i.v access sites for erythema, induration, discharge or tenderness. · As always, continue efforts to minimizetubes/lines/drains as clinically appropriate to reduce chances of line associated infections. Current isolation precautions: There are no current isolations documented for this patient. Level of complexity of visit: High     Risk of Complications/Morbidity: High     · Illness(es)/ Infection present that pose threat to life/bodily function. · There is potential for severe exacerbation of infection/side effects of treatment. · Therapy requires intensive monitoring for antimicrobial agent toxicity. Thank you for involving me in the care of your patient. I will continue to follow. If you have any additional questions, please do not hesitate to contact me. Subjective:     Presenting complaint in ER:     No chief complaint on file. HPI: Natalie Brown is a 59 y.o. female patient, who was seen at the request of Dr. Elsa Hardy MD.    History was obtained from chart review as the patient is currently intubated and is on a ventilator    The patient was admitted on 2/19/2020. I have been consulted to see the patient for above mentioned reason(s). The patient has multiple medical comorbidities, and presented to the ICU at [facility] for worsening shortness of breath as a transfer from [facility] due to lack of ICU beds there.   She presented to [facility] ER with shortness of breath and flulike symptoms that were apparently going on for 2 days. The patient was prescribed oral prednisone by her PCP couple of days before presentation to the ER, however, she never filled it. The patient was put on a nonrebreather and was hardly saturating up to 90% on 15 L oxygen. He was found to be in respiratory acidosis. She was started on IV vancomycin and cefepime and Tamiflu by the primary team.  Levofloxacin was added on 2/22/2020. The patient had a bronchoscopy done on 2/21/2020 and 2/23/2020 and BAL cultures have come back positive for MSSA and Aspergillus. The patient was apparently started on IV voriconazole on 2/24/2020 by primary team.    The patient has continued to have fever. T-max was one 2.8 at 4 PM yesterday. I have been asked to see the patient for this complicated infection. Past Medical History: All past medical history reviewed today. Past Medical History:   Diagnosis Date    Breast cancer     Cellulitis     Dermatitis     Hip fracture          Past Surgical History: All pastsurgical history was reviewed today. Past Surgical History:   Procedure Laterality Date    HIP SURGERY      MASTECTOMY      left side         Family History: All family history was reviewed today. No family history on file. Medications: All current and past medications were reviewed. Medications Prior to Admission: DULoxetine (CYMBALTA) 60 MG extended release capsule, Take 60 mg by mouth daily  hydroCHLOROthiazide (HYDRODIURIL) 25 MG tablet, Take 25 mg by mouth daily  cephALEXin (KEFLEX) 250 MG capsule, Take 250 mg by mouth 4 times daily. Aspirin Buf,CaCarb-MgCarb-MgO, (BUFFERIN EXTRA STRENGTH) 500 MG TABS, Take  by mouth. ibuprofen (ADVIL;MOTRIN) 200 MG tablet, Take 800 mg by mouth every 6 hours as needed   aspirin 325 MG EC tablet, Take 500 mg by mouth every 4 hours as needed.   [DISCONTINUED] aspirin-acetaminophen-caffeine (EXCEDRIN MIGRAINE) 250-250-65 MG per tablet, Take 1 tablet by mouth every 6 hours as needed.  lactulose  20 g Oral BID    ceFAZolin  2 g Intravenous Q8H    sennosides-docusate sodium  2 tablet Oral Daily    metoprolol tartrate  25 mg Per NG tube BID    sodium chloride (Inhalant)  3 mL Nebulization TID    voriconazole  4 mg/kg (Adjusted) Intravenous Q12H    insulin lispro  0-6 Units Subcutaneous Q4H    chlorhexidine  15 mL Mouth/Throat BID    albuterol sulfate HFA  6 puff Inhalation Q4H    ipratropium  6 puff Inhalation Q4H    lidocaine PF  5 mL Intradermal Once    bacitracin-polymyxin b   Topical Once    pantoprazole  40 mg Intravenous Daily    enoxaparin  40 mg Subcutaneous Daily    sodium chloride flush  10 mL Intravenous 2 times per day          REVIEW OF SYSTEMS:       Review of Systems   Unable to perform ROS: Intubated          Objective:       PHYSICAL EXAM:      Vitals:   Vitals:    03/02/20 0812 03/02/20 0904 03/02/20 1000 03/02/20 1130   BP:  (!) 110/57 (!) 115/53    Pulse: 70 70 68 69   Resp:  21 21 22   Temp:       TempSrc:       SpO2:  92% 96% 92%   Weight:       Height:           Physical Exam      General: intubated and sedated, on ventilator   HEENT: normocephalic, atraumatic, sclera clear, pupils equal, light reflex preserved bilaterally, endotracheal tube in place  Cardiovascular: Positive murmur  Pulmonary: b/l diffuse rales   Abdomen/GI: soft, no organomegaly, bowel sounds positive   Neuro: sedated, PERRL, moves all extremities when off sedation per RN  Skin: no skin tears or ulcers, no rash   Musculoskeletal:  No joint swelling, redness. No limitation of range of passive motion  Genitourinary: Garza's catheter in place  Psych: could not assess  Lymphatic/Immunologic: No obvious bruising, no cervical lymphadenopathy    Lines: All vascular access sites are healthy with no local erythema, discharge or tenderness    Intake and output:     I/O last 3 completed shifts:   In: 2554.9 [I.V.:633.3; NG/GT:1722; IV Piggyback:199.6]  Out: 3000 [Urine:3000]    Lab

## 2020-03-02 NOTE — PROGRESS NOTES
Propofol and Fentanyl gtts paused for sedation vacation, see MAR.  Danielle Mistry RN, BSN, CCRN-[facility], 5:10 AM

## 2020-03-02 NOTE — PROGRESS NOTES
Nutrition Assessment (Enteral Nutrition)    Type and Reason for Visit: Reassess    Nutrition Recommendations:   1. Recommend EN support to resume s/p bronchoscopy. 2. Recommend Vital High Protein at increased goal rate 50 mL per hour to provide 1200 mL total volume, 1200 calories, 105 grams protein, 1003 mL free water. 3. Recommend to continue water bolus 200 mL q 4 hours. 4. Ensure head of bed is 30 - 45 degrees during continuous or bolus gastric feeding and for one hour after bolus. Turn off the feeding if head of bed is lowered less than 30 degrees. 5. Monitor for tolerance (residuals greater than 500 mL, bowel habits, N/V, cramping). 6. Bowel regimen per MD    Nutrition Assessment: Pt's nutrition status has remained stable. EN on hold this morning for bronchoscopy. Pt has continued to tolerate Vital High Protein at goal rate 45 mL per hour. Residuals WNL. Small/smear BM 2/27; lactulose today. WIll monitor for continued tolerance to EN and bowel function. Malnutrition Assessment:  · Malnutrition Status: No malnutrition    Nutrition Risk Level: High    Nutrition Needs:  · Estimated Daily Total Kcal: 7717-2630   · Estimated Daily Protein (g): 109 grams   · Estimated Daily Fluid (ml/day): 3620-4344 mL    Nutrition Diagnosis:   · Problem: Inadequate oral intake  · Etiology: related to Impaired respiratory function-inability to consume food     Signs and symptoms:  as evidenced by Intubation    Objective Information:  · Nutrition-Focused Physical Findings: +2 generalized edema. +2 pitting BUE/BLE edema. +12.7 liters.    · Wound Type: None  · Current Nutrition Therapies:  · Oral Diet Orders: NPO   · Oral Diet intake: NPO  · Oral Nutrition Supplement (ONS) Orders: None  · ONS intake: NPO  · Tube Feeding (TF) Orders:   · Feeding Route: Orogastric  · Formula: Low Calorie, High Protein  · Duration: Continuous  · Additives/Modulars:    · Water Flushes: 200 mL q 4 hours   · Current TF & Flush Orders Provides: Vital High Protein at 45 mL per hour to provide 1080 mL total volume, 1080 calories, 95 grams protein, 903 mL free water   · Goal TF & Flush Orders Provides: Vital High Protein at 50 mL per hour to provide 1200 mL total volume, 1200 calories, 105 grams protein, 1003 mL free water  · Additional Calories: Propofol at 10.9 mL per hour to provide 288 calories from fat daily   · Anthropometric Measures:  · Ht: 5' 4\" (162.6 cm)   · Current Body Wt: 263 lb (119.3 kg)  · Admission Body Wt: 242 lb 8.1 oz (110 kg)  · Ideal Body Wt: 120 lb (54.4 kg)  · BMI Classification: BMI > or equal to 40.0 Obese Class III    Nutrition Interventions:   Continue current Tube Feeding  Continued Inpatient Monitoring, Education Not Indicated    Nutrition Evaluation:   · Evaluation: Goal achieved   · Goals: Pt will tolerate EN at goal    · Monitoring: TF Intake, TF Tolerance, I&O, Weight, Pertinent Labs, Monitor Bowel Function      Electronically signed by Alfonso Bolton RD, AGUILAR on 3/2/20 at 9:05 AM    Contact Number: [phone]

## 2020-03-03 NOTE — PROGRESS NOTES
Patient's friend (alternate POA) Leny here talking with patient. Updated her on situation . She told patient that she will honor her wishes and will be speaking with Yung Bray. Explained DNR-CC to Leny. She verbalizes understanding.

## 2020-03-03 NOTE — PROGRESS NOTES
Extubated per patient's request to room air. Patient refuses oxygen at this time. Informed her of medications that have been ordered for comfort if she would like to receive them. Patient nods understanding.

## 2020-03-03 NOTE — PROGRESS NOTES
Dr. Stevens Human notified via office that patient is alert today and does not want to have a tracheotomy done.

## 2020-03-03 NOTE — PROGRESS NOTES
03/02/20 1916   Vent Patient Data   Plateau Pressure 33 [id number]   Static Compliance 14 mL/cmH2O   Dynamic Compliance 13.02 mL/cmH2O

## 2020-03-03 NOTE — PROGRESS NOTES
[facility] PROGRESS NOTE    3/3/2020 10:23 AM        Name: Dilshad Cadet . Admitted: 2/19/2020  Primary Care Provider: Referring Not In System (Inactive) (Tel: None)                        Hospital course:      59 y. o. female who has history of depression and hypertension shortness of breath presented to [facility] with admitted to another emergency room there is no bed in the ICU and patient transferred to our facility.  days history of increasing shortness of breath and flulike symptoms.  Went to primary care physician office and prescribed prednisone by mouth however never filled it.  Her saturation in the emergency room was 50% on room air.  Patient placed on nonrebreather at 15 L and hardly increased desaturation to 90%.  Her initial temp was 100.1, pulse rate 108, blood pressure 152/94.    Influenza A was positive and Tamiflu started, patient transferred to [facility] ICU. Patient intubated on 2/21/2020, remains intubated and sedated       Subjective:  Intubated and sedated  No acute events overnight, had bronch yesterday  Reviewed interval ancillary notes    Current Medications  lactulose (CHRONULAC) 10 GM/15ML solution 20 g, BID  ceFAZolin (ANCEF) 2 g in dextrose 5 % 100 mL IVPB, Q8H  sodium chloride flush 0.9 % injection 10 mL, 2 times per day  sodium chloride flush 0.9 % injection 10 mL, PRN  vancomycin (VANCOCIN) 1,500 mg in dextrose 5 % 250 mL IVPB, On Call to OR    And  ciprofloxacin (CIPRO) IVPB 400 mg, On Call to OR  sennosides-docusate sodium (SENOKOT-S) 8.6-50 MG tablet 2 tablet, Daily  metoprolol tartrate (LOPRESSOR) tablet 25 mg, BID  sodium chloride (Inhalant) 3 % nebulizer solution 3 mL, TID  voriconazole (VFEND) 315 mg in dextrose 5 % 100 mL IVPB, Q12H  insulin lispro (1 Unit Dial) 0-6 Units, Q4H  potassium chloride 20 mEq/50 mL IVPB (Central Line), PRN  propofol injection, Titrated  fentaNYL (SUBLIMAZE) 1,000 mcg in sodium chloride 0.9 % 100 mL infusion, Continuous  chlorhexidine (PERIDEX) 0.12 % solution 15 mL, BID  albuterol sulfate  (90 Base) MCG/ACT inhaler 6 puff, Q4H  ipratropium (ATROVENT HFA) 17 MCG/ACT inhaler 6 puff, Q4H  lidocaine PF 1 % injection 5 mL, Once  bacitracin-polymyxin b (POLYSPORIN) ointment, Once  pantoprazole (PROTONIX) injection 40 mg, Daily  enoxaparin (LOVENOX) injection 40 mg, Daily  sodium chloride flush 0.9 % injection 10 mL, 2 times per day  sodium chloride flush 0.9 % injection 10 mL, PRN  acetaminophen (TYLENOL) tablet 650 mg, Q6H PRN  polyethylene glycol (GLYCOLAX) packet 17 g, Daily PRN  promethazine (PHENERGAN) tablet 12.5 mg, Q6H PRN  ondansetron (ZOFRAN) injection 4 mg, Q6H PRN        Objective:  BP (!) 94/43   Pulse 71   Temp 98.7 °F (37.1 °C) (Temporal)   Resp 22   Ht 5' 4\" (1.626 m)   Wt 263 lb 0.1 oz (119.3 kg)   SpO2 95%   BMI 45.15 kg/m²     Intake/Output Summary (Last 24 hours) at 3/3/2020 1023  Last data filed at 3/3/2020 0600  Gross per 24 hour   Intake 2020.34 ml   Output 1540 ml   Net 480.34 ml      Wt Readings from Last 3 Encounters:   03/03/20 263 lb 0.1 oz (119.3 kg)       General appearance: intubated and sedated  Eyes: Sclera clear. Pupils equal.  ENT: Moist oral mucosa. Trachea midline, no adenopathy. Cardiovascular: Regular rhythm, normal S1, S2. No murmur. No edema in lower extremities  Respiratory: no wheezing + rhonchi  GI: Abdomen soft, no tenderness, not distended, normal bowel sounds  Musculoskeletal: No cyanosis in digits, neck supple  Neurology: intubated and sedated  Psych: Normal affect.   Intubated and sedated   Skin: Warm, dry, normal turgor      Labs and Tests:  CBC:   Recent Labs     03/01/20  0413 03/02/20  0358 03/03/20  0357   WBC 12.1* 13.0* 11.8*   HGB 7.2* 7.2* 7.2*    294 314     BMP:    Recent Labs     03/01/20  0413 03/02/20  0358

## 2020-03-03 NOTE — PROGRESS NOTES
[facility] GI  Gastroenterology Progress Note  Leyla Melvin is a 59 y.o. female patient. SUBJECTIVE:  Now off sedation and shakes head \"no\" when discussing PEG placment. Physical    VITALS:  BP (!) 114/46   Pulse 86   Temp 98.7 °F (37.1 °C) (Temporal)   Resp 30   Ht 5' 4\" (1.626 m)   Wt 263 lb 0.1 oz (119.3 kg)   SpO2 98%   BMI 45.15 kg/m²   TEMPERATURE:  Current - Temp: 98.7 °F (37.1 °C); Max - Temp  Av.8 °F (37.1 °C)  Min: 97.8 °F (36.6 °C)  Max: 99.9 °F (37.7 °C)    Abdomen soft, ND, NT, no HSM, Bowel sounds normal     Data      Recent Labs     208 20  0357   WBC 12.1* 13.0* 11.8*   HGB 7.2* 7.2* 7.2*   HCT 21.9* 21.8* 22.0*   MCV 93.2 92.4 92.7    294 314     Recent Labs     208 20  0357    141 136   K 3.7 3.9 4.0    101 96*   CO2 32 33* 33*   BUN 22* 16 15   CREATININE <0.5* <0.5* <0.5*     No results for input(s): AST, ALT, ALB, BILIDIR, BILITOT, ALKPHOS in the last 72 hours. No results for input(s): LIPASE, AMYLASE in the last 72 hours. ASSESSMENT :    Respiratory failure/Oropharyngeal dysphagia - unable to eat and in need of a durable route for nutrition support. PLAN   :  1) Hold on PEG for now based on patient wishes. 2) Agree with palliative care and discussion with family: goals of treatment/end of life. We are available if there is a change and patient wishes to proceed with PEG.     Mariano Mehta MD  [address]  3/3/2020

## 2020-03-03 NOTE — PROGRESS NOTES
[facility] Spoke with Darby Madsen, need to call with time of death.

## 2020-03-03 NOTE — PROGRESS NOTES
Palliative Care:     Patient's best friend and second Radhika Knowles is here. Patient again affirms no trach or PEG she is aware she could pass away. She indicates she is tired. She agrees to comfort medications once extubated. Her friend will stay with her. She is awaiting another good friend's arrival. Spoke with sister Ching Ramires, she had wanted to override patient's decision for trach and PEG. She spoke with her  and now understands patent is alert and able to make her own medical decisions.  Sister Ching Ramires states she will not be coming to the hospital.

## 2020-03-03 NOTE — PROGRESS NOTES
Assessment completed, sedated on propofol and fentanyl gtts, see eMAR for rates. Intubated on ventilator, lung sounds with rhonchi throughout. Suctioned for small amount of creamy white secretions via ETT. OG at 59 cm lip line, clamped for bronchoscopy today. Bowel sounds hypoactive. No distress present.

## 2020-03-03 NOTE — PROGRESS NOTES
Sedation vacation for 18 minutes, off propofol and fentanyl, opens eyes to name and follows commands weakly. Propofol and fentanyl gtts restarted.

## 2020-03-03 NOTE — PROGRESS NOTES
Pt tolerated spontaneous breathing trial well since 0615 on PSV 15, cpap 5, 40%, with f/vt averaging 80.

## 2020-03-03 NOTE — PROGRESS NOTES
Responded to request made by attending RN, Taj Pacheco, for grief/spiritual support of pt's family/friends. Provided grief/spiritual support for pt's lifetime friend and those present with her at time of pt's death. Pt's family not present. Prayed at bedside with those gathered and actively listened as friends reminisced about their relationship with pt. All were supportive of one another. Introduced availability of follow-up spiritual care. Friend to call for grief support as wanted and/or needed.

## 2020-03-03 NOTE — PROGRESS NOTES
[facility] notified of time patient went asystole but that we do not have the official time of death from the pronouncing physician at this time. Need to call back with the official time of death. Friends have left at this time.

## 2020-03-03 NOTE — PROGRESS NOTES
Pt flipped to spontaneous by RT, propofol left on @ 10 mcg/kg/min to help w/ anxiety. Respirations 26-30, HR 71, spo2 100%, pulling tidal volumes 330-360. Pt appears in no distress.  Will continue spontaneous trial

## 2020-03-03 NOTE — PROGRESS NOTES
Spoke with patient who adamently shakes head no when asked if she wants a feeding tube or tracheostomy placed. Continues to shake head no even when told she may die once ETT is removed without placing a tracheostomy. Patient continues to respond in this way after all sedation has been off for 30 minutes. Patient does have advanced directives on chart which say no feeding tube. Spoke with sister Bren Velasquez and informed her of patient's wishes and what the rehab period could look like if she did receive the trach or feeding. She will be talking with her mother and calling us back. Feeding tube placement on hold until decision is made. Dr. Barros Course notified. Endoscopy notified.

## 2020-03-03 NOTE — PROGRESS NOTES
Spoke with sister Deric Dodson concerning plans for EGD at bedside tomorrow and tracheostomy on Wednesday. Verbal consent was given for both procedures. Dr. Roxanna Diamond had spoken with her earlier this shift concerning the need for these.

## 2020-03-03 NOTE — CONSULTS
elevated at 49 mmHg   assuming a right atrial pressure of 3 mmHg. -There is a small circumferential pericardial effusion noted. -There is a large bilateral pleural effusions. CXR 2/28/20  FINDINGS:   ET tube and left-sided central line appear unchanged       Heterogeneous opacity is seen throughout the right lung, similar to prior       Heterogeneous opacity is seen throughout the left lung, similar to prior       No obvious change in left apical pneumothorax.           Impression   No change in left apical pneumothorax       No change in multifocal airspace disease       CT Chest 2/27/20:  FINDINGS:   Mediastinum: There is mild mediastinal lymphadenopathy.  The heart is mildly   enlarged.  The main pulmonary artery is again noted to be dilated measuring   up to 4.5 cm, similar in appearance to prior.  The heart, pericardium, and   great vessels are without acute process.       Lungs/pleura: Endotracheal tube is again seen with tip in the midthoracic   trachea.  The central airways are patent without mucous plugging identified. There is a small left-sided pneumothorax which measures up to 2 cm in   greatest dimension.  There is persistent multifocal patchy airspace   consolidation.  Consolidation in the right upper lung has increased compared   to prior.  Aeration in the left upper lobe is also slightly increased.    Findings in the right middle lobe and lower lobes are improved.  There are   bilateral air bronchograms.  There are trace bilateral pleural effusions.       Upper Abdomen: Nasoenteric tube is seen with tip extending to at least the   mid stomach.  The visualized upper abdomen is without acute process.       Soft Tissues/Bones: Soft tissues and osseous structures are without acute   process.           Impression   Small left-sided pneumothorax measuring up to 2 cm, new compared to prior CT   from February 20th.  No midline shift.       Persistent multifocal airspace consolidation with air bronchograms consistent   with multifocal pneumonia.  Aeration in the right middle and lower lobes is   improved compared to prior.  Consolidation in the upper lungs is increased   compared to prior, right greater than left.       Trace bilateral pleural effusions.       Main pulmonary artery is again noted to be dilated which can be seen with   pulmonary hypertension. CT Head 2/24/20:  FINDINGS:   BRAIN/VENTRICLES: There is no acute intracranial hemorrhage, mass effect or   midline shift.  No abnormal extra-axial fluid collection.  The gray-white   differentiation is maintained without evidence of an acute infarct.  There is   no evidence of hydrocephalus. There is mild nonspecific abnormal low   attenuation within the periventricular and subcortical white matter, likely   representing chronic ischemic microvascular change.       ORBITS: The visualized portion of the orbits demonstrate no acute abnormality.       SINUSES: Mild bilateral ethmoid sinus mucosal thickening is identified.       There is mild opacification of the inferior right mastoid air cells,   including fluid.       SOFT TISSUES/SKULL:  No acute abnormality of the visualized skull or soft   tissues.           Impression   No acute intracranial abnormality.       Mild inferior right mastoid air cell opacification including fluid.             Past Medical History:   has a past medical history of Breast cancer ([address] Cellulitis, Dermatitis, and Hip fracture ([address] Past Surgical History:   has a past surgical history that includes Mastectomy; hip surgery; and bronchoscopy (N/A, 3/2/2020).     Advance Directives:      Code status is full     Problem Severity: Pain/Other Symptoms:      Patient denies pain    Bed Mobility/Toileting/Transfer:      Patient has been independent with ADLs    Performance Status:      Patient is ambulatory    Symptom Assessment: Appetite/Nausea/Bowels/Fatigue:   No weight changes prior to hospital admission Social History:   reports that she has never smoked. She does not have any smokeless tobacco history on file. She reports that she does not drink alcohol or use drugs. Family History:  family history is not on file. Psychological/Spiritual:       Patient lives with her sister and her mother lives locally. Patient is [employer]ism.    Family Discussion:      Patient is alert off sedation this AM She is able to nod yes and no. She did indicate she did not completely understand her current illness, This was explained to her. Discussed goals of care. Patient did nod yes when asked if \"trying to get better was important to her. \" Discussed ARDS and complications of influenza, the need for tracheotomy. She understands she has been weak from prolonged illness. Every time the word tracheotomy was mentioned patient would nod no. She nodded yes she understood when told not having a tracheotomy likely to negatively impact her prognosis. Spoke with sister Austin Estrada. She states her sibling passed away last year with cancer. Sister states patient \"is not that bright and often times does not completely understand when things are explained to her. \" States patient is not typically fearful of MDs, hospitals, etc. She states her friend will be up later to meet with patient. Sister and mother are on walkers. She would like  to see patient. She does understand hospital cannot force procedure on patient. Will continue to follow.

## 2020-03-03 NOTE — PROGRESS NOTES
Assessment complete, vitals obtained. Pt sedated on vent w/ propofol and fentanyl. Pupils equal & reactive. + gag. ETT @ 23 cm, AC 20 /  / peep 5 / 40%. Lungs w/ crackles and expiratory wheezes. Abdomen rounded / rotund, soft, active bowel sounds. OG @ 60 cm, tube feed residual 60 ml. Skin warm & intact, mildly diaphoretic to hands. BG 99. Pulses palpable. SCDs in place. Garza draining yellow urine. Scheduled meds given, see MAR.

## 2020-03-03 NOTE — PROGRESS NOTES
Dariana Nuñez notified of official time of death. Referral #[id number], They placed a hold on the body at this time.

## 2020-03-03 NOTE — PROGRESS NOTES
Called to certify patient who  following care withdrawal and terminal extubation this afternoon  No cardiorespiratory activity  No breath sounds  No hearts sounds  Pupils fixed and dilated  Pronounced dead  Time of death 1614hrs 3/3/2020     Prelim cause: ARDS  Acute hypoxic respiratory failure  Multifocal pneumonia-influenza/MSSA and Aspergillus

## 2020-03-03 NOTE — ANESTHESIA PRE PROCEDURE
Department of Anesthesiology  Preprocedure Note       Name:  Nan Boogie   Age:  59 y.o.  :  1955                                          MRN:  [id number]         Date:  3/3/2020      Surgeon: Rona Novak):  Albin Lebron MD    Procedure: TRACHEOSTOMY (N/A )    Medications prior to admission:   Prior to Admission medications    Medication Sig Start Date End Date Taking? Authorizing Provider   DULoxetine (CYMBALTA) 60 MG extended release capsule Take 60 mg by mouth daily   Yes Historical Provider, MD   hydroCHLOROthiazide (HYDRODIURIL) 25 MG tablet Take 25 mg by mouth daily   Yes Historical Provider, MD   cephALEXin (KEFLEX) 250 MG capsule Take 250 mg by mouth 4 times daily. Historical Provider, MD   Aspirin Buf,CaCarb-MgCarb-MgO, (BUFFERIN EXTRA STRENGTH) 500 MG TABS Take  by mouth. Historical Provider, MD   ibuprofen (ADVIL;MOTRIN) 200 MG tablet Take 800 mg by mouth every 6 hours as needed     Historical Provider, MD   aspirin 325 MG EC tablet Take 500 mg by mouth every 4 hours as needed.  3/10/11   Historical Provider, MD       Current medications:    Current Facility-Administered Medications   Medication Dose Route Frequency Provider Last Rate Last Dose    lactulose (CHRONULAC) 10 GM/15ML solution 20 g  20 g Oral BID Bucky Murphy MD   20 g at 20 1107    ceFAZolin (ANCEF) 2 g in dextrose 5 % 100 mL IVPB  2 g Intravenous Q8H Janae Carrillo MD   Stopped at 20 1245    sodium chloride flush 0.9 % injection 10 mL  10 mL Intravenous 2 times per day Aneta Bone MD   10 mL at 20 1108    sodium chloride flush 0.9 % injection 10 mL  10 mL Intravenous PRN Aneta Bone MD        vancomycin (VANCOCIN) 1,500 mg in dextrose 5 % 250 mL IVPB  1,500 mg Intravenous On Call to Coni Chatman MD        And    ciprofloxacin (CIPRO) IVPB 400 mg  400 mg Intravenous On Call to Coni Chatman MD        sennosides-docusate sodium (SENOKOT-S) 8.6-50 MG tablet 2 tablet  2 tablet Oral Daily Amy Gale MD   2 tablet at 03/03/20 1107    metoprolol tartrate (LOPRESSOR) tablet 25 mg  25 mg Per NG tube BID Handy Tee MD   25 mg at 03/03/20 1107    sodium chloride (Inhalant) 3 % nebulizer solution 3 mL  3 mL Nebulization TID Luisa Jeffries MD   3 mL at 03/03/20 0803    voriconazole (VFEND) 315 mg in dextrose 5 % 100 mL IVPB  4 mg/kg (Adjusted) Intravenous Q12H Amy Gale MD   Stopped at 03/03/20 1208    insulin lispro (1 Unit Dial) 0-6 Units  0-6 Units Subcutaneous Q4H Luisa Jeffries MD        potassium chloride 20 mEq/50 mL IVPB (Central Line)  20 mEq Intravenous PRN Tere Miller MD 50 mL/hr at 02/26/20 1420 20 mEq at 02/26/20 1420    propofol injection  10 mcg/kg/min Intravenous Titrated Luisa Jeffries MD 16.7 mL/hr at 03/03/20 0756 25 mcg/kg/min at 03/03/20 0756    fentaNYL (SUBLIMAZE) 1,000 mcg in sodium chloride 0.9 % 100 mL infusion  0.5 mcg/kg/hr Intravenous Continuous Luisa Jeffries MD 5.6 mL/hr at 03/03/20 0004 0.5 mcg/kg/hr at 03/03/20 0004    chlorhexidine (PERIDEX) 0.12 % solution 15 mL  15 mL Mouth/Throat BID Luisa Jeffries MD   15 mL at 03/03/20 1108    albuterol sulfate  (90 Base) MCG/ACT inhaler 6 puff  6 puff Inhalation Q4H Luisa Jeffries MD   6 puff at 03/03/20 1158    ipratropium (ATROVENT HFA) 17 MCG/ACT inhaler 6 puff  6 puff Inhalation Q4H Luisa Jeffries MD   6 puff at 03/03/20 1158    lidocaine PF 1 % injection 5 mL  5 mL Intradermal Once Luisa Jeffries MD        bacitracin-polymyxin b (POLYSPORIN) ointment   Topical Once Luisa Jeffries MD        pantoprazole (PROTONIX) injection 40 mg  40 mg Intravenous Daily Luisa Jeffries MD   40 mg at 03/03/20 1107    enoxaparin (LOVENOX) injection 40 mg  40 mg Subcutaneous Daily Luisa Jeffries MD   40 mg at 03/02/20 1033    sodium chloride flush 0.9 % injection 10 mL  10 mL Intravenous 2 times per day Karly Berry MD ONLY performed by Artis Currie MD at [address] [facility] [address]      left side       Social History:    Social History     Tobacco Use    Smoking status: Never Smoker   Substance Use Topics    Alcohol use: No                                Counseling given: Not Answered      Vital Signs (Current):   Vitals:    03/03/20 0900 03/03/20 1000 03/03/20 1100 03/03/20 1141   BP: (!) 89/43 (!) 91/43 (!) 114/46 (!) 92/44   Pulse: 73 74 86 75   Resp: 20 25 30 27   Temp:    37.6 °C (99.6 °F)   TempSrc:    Axillary   SpO2: 95% 96% 98% 96%   Weight:       Height:                                                  BP Readings from Last 3 Encounters:   03/03/20 (!) 92/44       NPO Status: Time of last liquid consumption: 2359                        Time of last solid consumption: 2359                        Date of last liquid consumption: 03/01/20(tube feed)                        Date of last solid food consumption: 03/01/20    BMI:   Wt Readings from Last 3 Encounters:   03/03/20 263 lb 0.1 oz (119.3 kg)     Body mass index is 45.15 kg/m².     CBC:   Lab Results   Component Value Date    WBC 11.8 03/03/2020    RBC 2.37 03/03/2020    HGB 7.2 03/03/2020    HCT 22.0 03/03/2020    MCV 92.7 03/03/2020    RDW 14.5 03/03/2020     03/03/2020       CMP:   Lab Results   Component Value Date     03/03/2020    K 4.0 03/03/2020    CL 96 03/03/2020    CO2 33 03/03/2020    BUN 15 03/03/2020    CREATININE <0.5 03/03/2020    GFRAA >60 03/03/2020    GFRAA >60 03/11/2011    AGRATIO 1.2 03/11/2011    LABGLOM >60 03/03/2020    GLUCOSE 90 03/03/2020    PROT 6.7 02/20/2020    PROT 7.3 03/11/2011    CALCIUM 8.7 03/03/2020    BILITOT 0.3 02/20/2020    ALKPHOS 85 02/20/2020    AST 33 02/20/2020    ALT 17 02/20/2020       POC Tests:   Recent Labs     03/03/20  1154   POCGLU 102*       Coags:   Lab Results   Component Value Date    PROTIME 13.6 03/02/2020    INR 1.17 03/02/2020    APTT 29.3 02/20/2020 HCG (If Applicable): No results found for: PREGTESTUR, PREGSERUM, HCG, HCGQUANT     ABGs:   Lab Results   Component Value Date    PHART 7.418 02/27/2020    PO2ART 87.1 02/27/2020    OCM8NOA 50.6 02/27/2020    CNG5MFQ 32.7 02/27/2020    BEART 7.3 02/27/2020    I7VTSZWP 97.2 02/27/2020        Type & Screen (If Applicable):  No results found for: LABABO, [address]    Anesthesia Evaluation  Patient summary reviewed and Nursing notes reviewed  Airway:        Comment: Pt is intubated and sedated on a propofol drip. Cannot examine at this time. Pt is surrounded by family   Dental:          Pulmonary:   (+) pneumonia: no interval change,  decreased breath sounds,                             Cardiovascular:  Exercise tolerance: poor (<4 METS),         ECG reviewed  Rhythm: regular  Rate: normal                    Neuro/Psych:   Negative Neuro/Psych ROS              GI/Hepatic/Renal: Neg GI/Hepatic/Renal ROS            Endo/Other:    (+) : arthritis:., .                 Abdominal:   (+) obese,     Abdomen: soft. Vascular: negative vascular ROS. Anesthesia Plan      general     ASA 4       Induction: intravenous. MIPS: Postoperative opioids intended and Prophylactic antiemetics administered. Plan/risks discussed with: Pt is intubated and sedated and surrounded by family. Pt has stated she does not want a trach and family is going to honor her wishes accordingly. Case will likely be cancelled. Plan discussed with attending.     Attending anesthesiologist reviewed and agrees with Pre Eval content              Vikas Pfeiffer, APRN - CRNA   3/3/2020

## 2020-03-03 NOTE — CONSULTS
Patient with respiratory failure now intubated with likely prolonged period of time. Tracheostomy requested. Now scheduled for Wednesday at 8:45. Will need off Lovenox today and tube feedings held after midnight tonight. Need op permit signed.

## 2020-03-04 ENCOUNTER — ANESTHESIA (OUTPATIENT)
Dept: ICU | Age: 65
End: 2020-03-04

## 2020-03-04 LAB
CULTURE, RESPIRATORY: ABNORMAL
CULTURE, RESPIRATORY: ABNORMAL
GRAM STAIN RESULT: ABNORMAL
MISCELLANEOUS LAB TEST ORDER: NORMAL
ORGANISM: ABNORMAL

## 2020-03-04 NOTE — PROGRESS NOTES
Propofol and fentanyl gtts turned off to assess patient's request to not have tracheostomy or feeding tube placed without sedation. Patient alert and calm and shakes head no when told that she would be having a feeding tube placed today or that they were planning to place a tracheostomy tomorrow. Patient shakes head yes when asked if she understands that without the trach she may very well die once ETT is removed.

## 2020-03-06 LAB
BLOOD CULTURE, ROUTINE: NORMAL
CULTURE, BLOOD 2: NORMAL

## 2020-03-23 LAB
FUNGUS (MYCOLOGY) CULTURE: ABNORMAL
FUNGUS STAIN: ABNORMAL
FUNGUS STAIN: ABNORMAL
ORGANISM: ABNORMAL

## 2020-04-06 LAB
FUNGUS (MYCOLOGY) CULTURE: NORMAL
FUNGUS STAIN: NORMAL

## 2020-04-07 LAB
AFB CULTURE (MYCOBACTERIA): NORMAL
AFB CULTURE (MYCOBACTERIA): NORMAL
AFB SMEAR: NORMAL
AFB SMEAR: NORMAL

## 2024-08-22 NOTE — PROGRESS NOTES
03/02/20 0745   Vent Patient Data   Plateau Pressure 31 [id number]   Static Compliance 17.5 mL/cmH2O   Dynamic Compliance 14.6 mL/cmH2O Quality 226: Preventive Care And Screening: Tobacco Use: Screening And Cessation Intervention: Tobacco Screening not Performed Detail Level: Simple Quality 431: Preventive Care And Screening: Unhealthy Alcohol Use - Screening: Patient not screened for unhealthy alcohol use using a systematic screening method

## (undated) DEVICE — SINGLE USE SUCTION VALVE MAJ-209: Brand: SINGLE USE SUCTION VALVE (STERILE)

## (undated) DEVICE — SYRINGE MED 10ML POLYPR LUERSLIP TIP FLAT TOP W/O SFTY DISP

## (undated) DEVICE — CONMED CHANNEL MASTER PULMONARY AND PEDIATRIC CLEANING BRUSH, 160 CM X 2.0 MM: Brand: CONMED

## (undated) DEVICE — PROCEDURE KIT ENDOSCP CUST

## (undated) DEVICE — SINGLE USE BIOPSY VALVE MAJ-210: Brand: SINGLE USE BIOPSY VALVE (STERILE)

## (undated) DEVICE — SPECIMEN TRAP: Brand: ARGYLE

## (undated) DEVICE — GOWN AURORA NONREINF LG: Brand: MEDLINE INDUSTRIES, INC.

## (undated) DEVICE — 60 ML SYRINGE,REGULAR TIP: Brand: MONOJECT